# Patient Record
Sex: MALE | Race: WHITE | NOT HISPANIC OR LATINO
[De-identification: names, ages, dates, MRNs, and addresses within clinical notes are randomized per-mention and may not be internally consistent; named-entity substitution may affect disease eponyms.]

---

## 2019-03-12 ENCOUNTER — APPOINTMENT (OUTPATIENT)
Dept: VASCULAR SURGERY | Facility: CLINIC | Age: 84
End: 2019-03-12
Payer: COMMERCIAL

## 2019-03-12 VITALS
BODY MASS INDEX: 28.63 KG/M2 | HEIGHT: 70 IN | WEIGHT: 200 LBS | TEMPERATURE: 97.8 F | OXYGEN SATURATION: 99 % | SYSTOLIC BLOOD PRESSURE: 150 MMHG | DIASTOLIC BLOOD PRESSURE: 64 MMHG | HEART RATE: 50 BPM

## 2019-03-12 DIAGNOSIS — M79.672 PAIN IN LEFT FOOT: ICD-10-CM

## 2019-03-12 DIAGNOSIS — Z82.49 FAMILY HISTORY OF ISCHEMIC HEART DISEASE AND OTHER DISEASES OF THE CIRCULATORY SYSTEM: ICD-10-CM

## 2019-03-12 DIAGNOSIS — Z85.46 PERSONAL HISTORY OF MALIGNANT NEOPLASM OF PROSTATE: ICD-10-CM

## 2019-03-12 DIAGNOSIS — Z86.79 PERSONAL HISTORY OF OTHER DISEASES OF THE CIRCULATORY SYSTEM: ICD-10-CM

## 2019-03-12 DIAGNOSIS — Z85.828 PERSONAL HISTORY OF OTHER MALIGNANT NEOPLASM OF SKIN: ICD-10-CM

## 2019-03-12 PROBLEM — Z00.00 ENCOUNTER FOR PREVENTIVE HEALTH EXAMINATION: Status: ACTIVE | Noted: 2019-03-12

## 2019-03-12 PROCEDURE — 99203 OFFICE O/P NEW LOW 30 MIN: CPT

## 2019-03-12 RX ORDER — ATORVASTATIN CALCIUM 10 MG/1
10 TABLET, FILM COATED ORAL
Refills: 0 | Status: ACTIVE | COMMUNITY

## 2019-03-12 RX ORDER — ATENOLOL 50 MG/1
TABLET ORAL
Refills: 0 | Status: ACTIVE | COMMUNITY

## 2019-03-12 NOTE — HISTORY OF PRESENT ILLNESS
[FreeTextEntry1] : 84 yo M with history of HTN, CAD s/p PCI, prostate cancer, skin cancer, presenting with left plantar foot pain while ambulating. Pt was evaluated by a podiatrist and was told to wear a padded insert to cushion the protruding bone near the ball of his foot. He denies claudication or rest pain. He denies gangrene or ulcers. Denies chest pain, SOB, JONES. No history of prior peripheral interventions. He has coronary stents.\par He underwent noninvasive arterial testing, which was negative for PAD.

## 2019-03-12 NOTE — PHYSICAL EXAM
[Normal Breath Sounds] : Normal breath sounds [Normal Heart Sounds] : normal heart sounds [Normal Rate and Rhythm] : normal rate and rhythm [2+] : left 2+ [Ankle Swelling (On Exam)] : present [Ankle Swelling On The Left] : of the left ankle [Ankle Swelling On The Right] : mild [Varicose Veins Of Lower Extremities] : not present [] : not present [No Rash or Lesion] : No rash or lesion [Alert] : alert [Oriented to Person] : oriented to person [Oriented to Place] : oriented to place [Oriented to Time] : oriented to time [Calm] : calm [de-identified] : NAD [de-identified] : EOMI [de-identified] : supple [FreeTextEntry1] : <2 sec cap refill\par Tenderness over a bony prominence on plantar aspect of left foot [de-identified] : soft, NT, ND, no pulsatile mass [de-identified] : FROM of all 4 extremities

## 2019-03-12 NOTE — ASSESSMENT
[FreeTextEntry1] : 82 yo M with history of HTN, CAD s/p PCI, prostate cancer, skin cancer, presenting with left plantar foot pain while ambulating. No evidence of PAD.\par \par JAIME/PVR study reviewed-- normal study.

## 2022-03-29 ENCOUNTER — NON-APPOINTMENT (OUTPATIENT)
Age: 87
End: 2022-03-29

## 2022-03-29 ENCOUNTER — APPOINTMENT (OUTPATIENT)
Dept: OPHTHALMOLOGY | Facility: CLINIC | Age: 87
End: 2022-03-29
Payer: MEDICARE

## 2022-03-29 PROCEDURE — 92014 COMPRE OPH EXAM EST PT 1/>: CPT

## 2022-05-03 ENCOUNTER — APPOINTMENT (OUTPATIENT)
Dept: OPHTHALMOLOGY | Facility: CLINIC | Age: 87
End: 2022-05-03
Payer: MEDICARE

## 2022-05-03 ENCOUNTER — NON-APPOINTMENT (OUTPATIENT)
Age: 87
End: 2022-05-03

## 2022-05-03 PROCEDURE — 99213 OFFICE O/P EST LOW 20 MIN: CPT

## 2022-05-03 PROCEDURE — 92136 OPHTHALMIC BIOMETRY: CPT

## 2022-05-17 ENCOUNTER — NON-APPOINTMENT (OUTPATIENT)
Age: 87
End: 2022-05-17

## 2022-05-18 ENCOUNTER — APPOINTMENT (OUTPATIENT)
Dept: OPHTHALMOLOGY | Facility: HOSPITAL | Age: 87
End: 2022-05-18
Payer: MEDICARE

## 2022-05-18 PROCEDURE — 66984 XCAPSL CTRC RMVL W/O ECP: CPT | Mod: LT

## 2022-05-19 ENCOUNTER — NON-APPOINTMENT (OUTPATIENT)
Age: 87
End: 2022-05-19

## 2022-05-19 ENCOUNTER — APPOINTMENT (OUTPATIENT)
Dept: OPHTHALMOLOGY | Facility: CLINIC | Age: 87
End: 2022-05-19
Payer: MEDICARE

## 2022-05-19 PROCEDURE — 99024 POSTOP FOLLOW-UP VISIT: CPT

## 2022-05-24 ENCOUNTER — APPOINTMENT (OUTPATIENT)
Dept: OPHTHALMOLOGY | Facility: CLINIC | Age: 87
End: 2022-05-24
Payer: MEDICARE

## 2022-05-24 ENCOUNTER — NON-APPOINTMENT (OUTPATIENT)
Age: 87
End: 2022-05-24

## 2022-05-24 PROCEDURE — 99024 POSTOP FOLLOW-UP VISIT: CPT

## 2022-06-14 ENCOUNTER — NON-APPOINTMENT (OUTPATIENT)
Age: 87
End: 2022-06-14

## 2022-06-14 ENCOUNTER — APPOINTMENT (OUTPATIENT)
Dept: OPHTHALMOLOGY | Facility: CLINIC | Age: 87
End: 2022-06-14
Payer: MEDICARE

## 2022-06-14 PROCEDURE — 99024 POSTOP FOLLOW-UP VISIT: CPT

## 2022-09-20 ENCOUNTER — APPOINTMENT (OUTPATIENT)
Dept: OPHTHALMOLOGY | Facility: CLINIC | Age: 87
End: 2022-09-20

## 2024-01-18 ENCOUNTER — EMERGENCY (EMERGENCY)
Facility: HOSPITAL | Age: 89
LOS: 1 days | Discharge: HOME | End: 2024-01-18
Attending: EMERGENCY MEDICINE
Payer: MEDICARE

## 2024-01-18 VITALS
OXYGEN SATURATION: 95 % | WEIGHT: 199.96 LBS | HEART RATE: 75 BPM | RESPIRATION RATE: 18 BRPM | HEIGHT: 70 IN | SYSTOLIC BLOOD PRESSURE: 129 MMHG | TEMPERATURE: 98 F | DIASTOLIC BLOOD PRESSURE: 86 MMHG

## 2024-01-18 VITALS
DIASTOLIC BLOOD PRESSURE: 85 MMHG | RESPIRATION RATE: 18 BRPM | SYSTOLIC BLOOD PRESSURE: 159 MMHG | TEMPERATURE: 98 F | OXYGEN SATURATION: 98 % | HEART RATE: 78 BPM

## 2024-01-18 PROCEDURE — 99284 EMERGENCY DEPT VISIT MOD MDM: CPT

## 2024-01-18 PROCEDURE — G1004: CPT

## 2024-01-18 PROCEDURE — 90471 IMMUNIZATION ADMIN: CPT

## 2024-01-18 PROCEDURE — 90715 TDAP VACCINE 7 YRS/> IM: CPT

## 2024-01-18 PROCEDURE — 99284 EMERGENCY DEPT VISIT MOD MDM: CPT | Mod: 25

## 2024-01-18 PROCEDURE — 70450 CT HEAD/BRAIN W/O DYE: CPT | Mod: MG

## 2024-01-18 PROCEDURE — 70450 CT HEAD/BRAIN W/O DYE: CPT | Mod: 26,MG

## 2024-01-18 RX ORDER — TETANUS TOXOID, REDUCED DIPHTHERIA TOXOID AND ACELLULAR PERTUSSIS VACCINE, ADSORBED 5; 2.5; 8; 8; 2.5 [IU]/.5ML; [IU]/.5ML; UG/.5ML; UG/.5ML; UG/.5ML
0.5 SUSPENSION INTRAMUSCULAR ONCE
Refills: 0 | Status: COMPLETED | OUTPATIENT
Start: 2024-01-18 | End: 2024-01-18

## 2024-01-18 RX ADMIN — TETANUS TOXOID, REDUCED DIPHTHERIA TOXOID AND ACELLULAR PERTUSSIS VACCINE, ADSORBED 0.5 MILLILITER(S): 5; 2.5; 8; 8; 2.5 SUSPENSION INTRAMUSCULAR at 12:04

## 2024-01-18 NOTE — ED ADULT NURSE NOTE - CAS EDN DISCHARGE ASSESSMENT
Spoke to patient. Patient is worried after reviewing his test results particularly urinalysis ketones showed trace and occult blood was also trace. Please advise.    Alert and oriented to person, place and time

## 2024-01-18 NOTE — ED PROVIDER NOTE - CARE PLAN
1 Principal Discharge DX:	Laceration of eyebrow and forehead  Secondary Diagnosis:	Closed head injury

## 2024-01-18 NOTE — ED ADULT NURSE NOTE - NS ED NURSE LEVEL OF CONSCIOUSNESS SPEECH
SCHEDULED MEDIATION ROCEPHIN GIVEN AND TOLERATED WELL. NO S/S OF RESPIRATORY DISTRESS OR 
DISCOMFORT NOTED AT THIS TIME. WILL CONTINUE TO MONITOR. Speaking Coherently

## 2024-01-18 NOTE — ED ADULT NURSE NOTE - NSFALLHARMRISKINTERV_ED_ALL_ED

## 2024-01-18 NOTE — ED ADULT TRIAGE NOTE - CHIEF COMPLAINT QUOTE
Pt BIBA from [facility], unwitnessed fall, hit head, denies blood thinners, unsure of LOC, hx of dementia

## 2024-01-18 NOTE — ED ADULT NURSE NOTE - OBJECTIVE STATEMENT
c/o mechanical trip and fall from standing height, + head strike, - LOC, - blood thinners. Pt denies HA, dizziness, SOB, N/V/D, CP, palpitations, fevers, chills, pain. PMH of vascular history, HTN, a fib. Pt Aox4, speaking coherently, respirations even and unlabored on RA skin warm and dry, bruising noted to left forehead.

## 2024-01-18 NOTE — ED PROVIDER NOTE - PATIENT PORTAL LINK FT
You can access the FollowMyHealth Patient Portal offered by [facility] by registering at the following website: [email or web address]. By joining [facility]’s FollowMyHealth portal, you will also be able to view your health information using other applications (apps) compatible with our system.

## 2024-01-18 NOTE — ED ADULT NURSE REASSESSMENT NOTE - NS ED NURSE REASSESS COMMENT FT1
Pt resting comfortably, denies HA, dizziness, SOB, N/V/D, CP, palpitations, fevers, chills, pain. Pt AOx4, speaking coherently, respirations even and unlabored on RA, skin warm and dry. Transportation arranged for DC

## 2024-01-18 NOTE — CHART NOTE - NSCHARTNOTEFT_GEN_A_CORE
SWNote: p/c from pt's EDMD (Dr Flores) to inform worker pt ready for d/c, spoke with staff at [facility] was told pt can return as long as Cscan is negative. Pt needs transportation to return. Worker called [facility]/Vanessa spoke with Donya ,d/c to be faxed at [phone] and natalie to be arranged asap. [facility] called(Charity )  natalie arranged, ETA first available . [facility]/billing letter will be left at nurse station.

## 2024-01-18 NOTE — ED ADULT NURSE REASSESSMENT NOTE - NS ED NURSE REASSESS COMMENT FT1
Pt resting comfortably, denies HA, dizziness, SOB, N/V/D, CP, palpitations, fevers, chills. Pt Aox4, speaking coherently, respirations even and unlabored on RA, skin warm and dry. VS recorded. No IV present. Ambulance present for DC back to [facility].

## 2024-01-18 NOTE — ED ADULT NURSE NOTE - CHIEF COMPLAINT QUOTE
Pt BIBA from [facility], unwitnessed fall, hit head, denies blood thinners, unsure of LOC, hx of dementia

## 2024-01-18 NOTE — ED PROVIDER NOTE - PHYSICAL EXAMINATION
Gen: NAD, awake and alert  Head: 2cm superficial laceration present to superior aspect of left eyebrow not actively bleeding  HEENT: oral mucosa moist, normal conjunctiva, oropharynx clear without exudate or erythema  Lung: CTAB, no respiratory distress, no wheezing, rales, rhonchi  CV: normal s1/s2, rrr, no murmurs, Normal perfusion  Abd: soft, NTND  MSK: No edema, no visible deformities, full range of motion in all 4 extremities  Neuro: No focal neurologic deficits, no midline c/t/l spine tenderness  Skin: laceration as noted above  Psych: normal affect

## 2024-01-28 ENCOUNTER — EMERGENCY (EMERGENCY)
Facility: HOSPITAL | Age: 89
LOS: 1 days | Discharge: HOME | End: 2024-01-28
Attending: EMERGENCY MEDICINE
Payer: MEDICARE

## 2024-01-28 VITALS
DIASTOLIC BLOOD PRESSURE: 86 MMHG | SYSTOLIC BLOOD PRESSURE: 136 MMHG | TEMPERATURE: 98 F | RESPIRATION RATE: 16 BRPM | OXYGEN SATURATION: 100 % | HEART RATE: 70 BPM | HEIGHT: 70 IN

## 2024-01-28 PROCEDURE — 72125 CT NECK SPINE W/O DYE: CPT | Mod: 26,MA

## 2024-01-28 PROCEDURE — 70450 CT HEAD/BRAIN W/O DYE: CPT | Mod: 26,MA

## 2024-01-28 PROCEDURE — 99284 EMERGENCY DEPT VISIT MOD MDM: CPT | Mod: GC

## 2024-01-28 NOTE — ED ADULT TRIAGE NOTE - CHIEF COMPLAINT QUOTE
Pt. BIBA from [facility] for fall. Pt. has hx of dementia, fall witnessed by other residents with dementia. Pt. doesn't remember falling, pt. denies complaints. Pt. is on eliquis. Pt. A&OX4 at this time. MD Kowalski at bedside for eval. Priority ct called.

## 2024-01-28 NOTE — ED PROVIDER NOTE - ATTENDING CONTRIBUTION TO CARE
patient with past medical history of A-fib on Eliquis and dementia brought in by EMS after a fall at the [facility].  CT head and C-spine ordered.  Patient at baseline mental status.  CTs unremarkable. Spoke with [facility] who accepts patient back.

## 2024-01-28 NOTE — ED PROVIDER NOTE - CLINICAL SUMMARY MEDICAL DECISION MAKING FREE TEXT BOX
patient with past medical history of A-fib on Eliquis and dementia brought in by EMS after a fall at the [facility].  CT head and C-spine ordered.  Patient at baseline mental status.  CTs unremarkable. Spoke with [facility] who accepts patient back. patient with past medical history of A-fib on Eliquis and dementia brought in by EMS after a fall at the [facility].  CT head and C-spine ordered.  Patient at baseline mental status.  CTs unremarkable. Spoke with [facility] who accepts patient back.

## 2024-01-28 NOTE — ED PROVIDER NOTE - NSFOLLOWUPINSTRUCTIONS_ED_ALL_ED_FT
-Please follow-up with your primary care doctor in the next 2 days.  Please call tomorrow for an appointment.  If you cannot follow-up with your primary care doctor please return to the ED for any urgent issues.  - You were given a copy of the tests performed today.  Please bring the results with you and review them with your primary care doctor.  - If you have any worsening of symptoms or any other concerns please return to the ED immediately.  - Please continue taking your home medications as directed.    Fall    It is common to have injuries to your face, neck, arms, and body after a fall. These injuries may include cuts, burns, bruises, and sore muscles. These injuries tend to feel worse for the first 24–48 hours but will start to feel better after that. Over the counter pain medications are effective in controlling pain.    SEEK IMMEDIATE MEDICAL CARE IF YOU HAVE ANY OF THE FOLLOWING SYMPTOMS: numbness, tingling, or weakness in your arms or legs, severe neck pain, changes in bowel or bladder control, shortness of breath, chest pain, blood in your urine/stool/vomit, headache, visual changes, lightheadedness/dizziness, or fainting.

## 2024-01-28 NOTE — ED PROVIDER NOTE - NS ED ATTENDING STATEMENT MOD
I have seen and examined this patient and fully participated in the care of this patient as the teaching attending.  The service was shared with the JACQUI.  I reviewed and verified the documentation. Attending with

## 2024-01-28 NOTE — ED PROVIDER NOTE - OBJECTIVE STATEMENT
88-year-old male patient brought in by EMS after a fall at the [facility].  Patient reportedly takes Eliquis for A-fib.  He is currently without complaint but has poor recollection of the events which he reports is his baseline secondary to dementia.   Patient brought directly to CAT scan for imaging. 88-year-old male patient brought in by EMS after a fall at the [facility].  Patient reportedly takes Eliquis for A-fib.  He is currently without complaint but has poor recollection of the events which he reports is his baseline secondary to dementia.   Patient brought directly to CAT scan for imaging.

## 2024-01-28 NOTE — ED PROVIDER NOTE - PATIENT PORTAL LINK FT
You can access the FollowMyHealth Patient Portal offered by [facility] by registering at the following website: [email or web address]. By joining [facility]’s FollowMyHealth portal, you will also be able to view your health information using other applications (apps) compatible with our system.

## 2024-01-28 NOTE — CHART NOTE - NSCHARTNOTEFT_GEN_A_CORE
SW Note: HALLE reviewed chart - pt was already cleared to return and MD had reached out to [facility] who can accept pt back. HALLE reached out to the [facility] [[phone]] and was able to speak to Brandi in admissions. Brandi confirmed pt was accepted back and reports they do not need any paperwork unless there are any changes in medication, status, or any new recs for pt. HALLE reached out to MD Resident - Resident confirmed there are no new changes for pt and that pt is at baseline. Resident confirmed that [facility] had already set up transportation for pt to return back. SW remains available as needed.

## 2024-01-29 ENCOUNTER — INPATIENT (INPATIENT)
Facility: HOSPITAL | Age: 89
LOS: 6 days | Discharge: HOME | DRG: 82 | End: 2024-02-05
Attending: STUDENT IN AN ORGANIZED HEALTH CARE EDUCATION/TRAINING PROGRAM | Admitting: STUDENT IN AN ORGANIZED HEALTH CARE EDUCATION/TRAINING PROGRAM
Payer: MEDICARE

## 2024-01-29 VITALS
HEART RATE: 100 BPM | HEIGHT: 70 IN | DIASTOLIC BLOOD PRESSURE: 64 MMHG | RESPIRATION RATE: 12 BRPM | OXYGEN SATURATION: 93 % | SYSTOLIC BLOOD PRESSURE: 147 MMHG | WEIGHT: 184.97 LBS | TEMPERATURE: 98 F

## 2024-01-29 DIAGNOSIS — S06.5XAA TRAUMATIC SUBDURAL HEMORRHAGE WITH LOSS OF CONSCIOUSNESS STATUS UNKNOWN, INITIAL ENCOUNTER: ICD-10-CM

## 2024-01-29 LAB
ALBUMIN SERPL ELPH-MCNC: 3.3 G/DL — SIGNIFICANT CHANGE UP (ref 3.3–5.2)
ALP SERPL-CCNC: 88 U/L — SIGNIFICANT CHANGE UP (ref 40–120)
ALT FLD-CCNC: 12 U/L — SIGNIFICANT CHANGE UP
ANION GAP SERPL CALC-SCNC: 9 MMOL/L — SIGNIFICANT CHANGE UP (ref 5–17)
APPEARANCE UR: CLEAR — SIGNIFICANT CHANGE UP
APTT BLD: 35.9 SEC — HIGH (ref 24.5–35.6)
AST SERPL-CCNC: 14 U/L — SIGNIFICANT CHANGE UP
BASE EXCESS BLDV CALC-SCNC: -0.6 MMOL/L — SIGNIFICANT CHANGE UP (ref -2–3)
BASOPHILS # BLD AUTO: 0.03 K/UL — SIGNIFICANT CHANGE UP (ref 0–0.2)
BASOPHILS NFR BLD AUTO: 0.5 % — SIGNIFICANT CHANGE UP (ref 0–2)
BILIRUB SERPL-MCNC: 0.5 MG/DL — SIGNIFICANT CHANGE UP (ref 0.4–2)
BILIRUB UR-MCNC: NEGATIVE — SIGNIFICANT CHANGE UP
BUN SERPL-MCNC: 19.1 MG/DL — SIGNIFICANT CHANGE UP (ref 8–20)
CA-I SERPL-SCNC: 1.18 MMOL/L — SIGNIFICANT CHANGE UP (ref 1.15–1.33)
CALCIUM SERPL-MCNC: 8.2 MG/DL — LOW (ref 8.4–10.5)
CHLORIDE BLDV-SCNC: 98 MMOL/L — SIGNIFICANT CHANGE UP (ref 96–108)
CHLORIDE SERPL-SCNC: 100 MMOL/L — SIGNIFICANT CHANGE UP (ref 96–108)
CO2 SERPL-SCNC: 26 MMOL/L — SIGNIFICANT CHANGE UP (ref 22–29)
COLOR SPEC: YELLOW — SIGNIFICANT CHANGE UP
CREAT SERPL-MCNC: 0.94 MG/DL — SIGNIFICANT CHANGE UP (ref 0.5–1.3)
DIFF PNL FLD: NEGATIVE — SIGNIFICANT CHANGE UP
EGFR: 78 ML/MIN/1.73M2 — SIGNIFICANT CHANGE UP
EOSINOPHIL # BLD AUTO: 0.12 K/UL — SIGNIFICANT CHANGE UP (ref 0–0.5)
EOSINOPHIL NFR BLD AUTO: 2 % — SIGNIFICANT CHANGE UP (ref 0–6)
ETHANOL SERPL-MCNC: <10 MG/DL — SIGNIFICANT CHANGE UP (ref 0–9)
GAS PNL BLDV: 132 MMOL/L — LOW (ref 136–145)
GAS PNL BLDV: SIGNIFICANT CHANGE UP
GLUCOSE BLDV-MCNC: 114 MG/DL — HIGH (ref 70–99)
GLUCOSE SERPL-MCNC: 127 MG/DL — HIGH (ref 70–99)
GLUCOSE UR QL: NEGATIVE MG/DL — SIGNIFICANT CHANGE UP
HCO3 BLDV-SCNC: 26 MMOL/L — SIGNIFICANT CHANGE UP (ref 22–29)
HCT VFR BLD CALC: 31.4 % — LOW (ref 39–50)
HCT VFR BLDA CALC: 30 % — SIGNIFICANT CHANGE UP
HGB BLD CALC-MCNC: 10.1 G/DL — LOW (ref 12.6–17.4)
HGB BLD-MCNC: 10.1 G/DL — LOW (ref 13–17)
IMM GRANULOCYTES NFR BLD AUTO: 0.5 % — SIGNIFICANT CHANGE UP (ref 0–0.9)
INR BLD: 1.68 RATIO — HIGH (ref 0.85–1.18)
KETONES UR-MCNC: NEGATIVE MG/DL — SIGNIFICANT CHANGE UP
LACTATE BLDV-MCNC: 1.2 MMOL/L — SIGNIFICANT CHANGE UP (ref 0.5–2)
LACTATE SERPL-SCNC: 1 MMOL/L — SIGNIFICANT CHANGE UP (ref 0.5–2)
LEUKOCYTE ESTERASE UR-ACNC: NEGATIVE — SIGNIFICANT CHANGE UP
LIDOCAIN IGE QN: 23 U/L — SIGNIFICANT CHANGE UP (ref 22–51)
LYMPHOCYTES # BLD AUTO: 0.96 K/UL — LOW (ref 1–3.3)
LYMPHOCYTES # BLD AUTO: 16.2 % — SIGNIFICANT CHANGE UP (ref 13–44)
MCHC RBC-ENTMCNC: 29 PG — SIGNIFICANT CHANGE UP (ref 27–34)
MCHC RBC-ENTMCNC: 32.2 GM/DL — SIGNIFICANT CHANGE UP (ref 32–36)
MCV RBC AUTO: 90.2 FL — SIGNIFICANT CHANGE UP (ref 80–100)
MONOCYTES # BLD AUTO: 0.65 K/UL — SIGNIFICANT CHANGE UP (ref 0–0.9)
MONOCYTES NFR BLD AUTO: 11 % — SIGNIFICANT CHANGE UP (ref 2–14)
NEUTROPHILS # BLD AUTO: 4.14 K/UL — SIGNIFICANT CHANGE UP (ref 1.8–7.4)
NEUTROPHILS NFR BLD AUTO: 69.8 % — SIGNIFICANT CHANGE UP (ref 43–77)
NITRITE UR-MCNC: NEGATIVE — SIGNIFICANT CHANGE UP
PCO2 BLDV: 51 MMHG — SIGNIFICANT CHANGE UP (ref 42–55)
PH BLDV: 7.31 — LOW (ref 7.32–7.43)
PH UR: 5.5 — SIGNIFICANT CHANGE UP (ref 5–8)
PLATELET # BLD AUTO: 175 K/UL — SIGNIFICANT CHANGE UP (ref 150–400)
PO2 BLDV: 45 MMHG — SIGNIFICANT CHANGE UP (ref 25–45)
POTASSIUM BLDV-SCNC: 4.1 MMOL/L — SIGNIFICANT CHANGE UP (ref 3.5–5.1)
POTASSIUM SERPL-MCNC: 4.2 MMOL/L — SIGNIFICANT CHANGE UP (ref 3.5–5.3)
POTASSIUM SERPL-SCNC: 4.2 MMOL/L — SIGNIFICANT CHANGE UP (ref 3.5–5.3)
PROT SERPL-MCNC: 5.4 G/DL — LOW (ref 6.6–8.7)
PROT UR-MCNC: NEGATIVE MG/DL — SIGNIFICANT CHANGE UP
PROTHROM AB SERPL-ACNC: 18.4 SEC — HIGH (ref 9.5–13)
RBC # BLD: 3.48 M/UL — LOW (ref 4.2–5.8)
RBC # FLD: 16.3 % — HIGH (ref 10.3–14.5)
SAO2 % BLDV: 66.6 % — SIGNIFICANT CHANGE UP
SODIUM SERPL-SCNC: 135 MMOL/L — SIGNIFICANT CHANGE UP (ref 135–145)
SP GR SPEC: >1.03 — HIGH (ref 1–1.03)
UROBILINOGEN FLD QL: 0.2 MG/DL — SIGNIFICANT CHANGE UP (ref 0.2–1)
WBC # BLD: 5.93 K/UL — SIGNIFICANT CHANGE UP (ref 3.8–10.5)
WBC # FLD AUTO: 5.93 K/UL — SIGNIFICANT CHANGE UP (ref 3.8–10.5)

## 2024-01-29 PROCEDURE — 70450 CT HEAD/BRAIN W/O DYE: CPT | Mod: 26

## 2024-01-29 PROCEDURE — 99291 CRITICAL CARE FIRST HOUR: CPT | Mod: 25,GC

## 2024-01-29 PROCEDURE — 71045 X-RAY EXAM CHEST 1 VIEW: CPT | Mod: 26

## 2024-01-29 PROCEDURE — 93306 TTE W/DOPPLER COMPLETE: CPT | Mod: 26

## 2024-01-29 PROCEDURE — 99223 1ST HOSP IP/OBS HIGH 75: CPT

## 2024-01-29 PROCEDURE — 93010 ELECTROCARDIOGRAM REPORT: CPT

## 2024-01-29 PROCEDURE — 74177 CT ABD & PELVIS W/CONTRAST: CPT | Mod: 26,MA

## 2024-01-29 PROCEDURE — 72170 X-RAY EXAM OF PELVIS: CPT | Mod: 26

## 2024-01-29 PROCEDURE — 99222 1ST HOSP IP/OBS MODERATE 55: CPT | Mod: GC

## 2024-01-29 PROCEDURE — 72125 CT NECK SPINE W/O DYE: CPT | Mod: 26,MA

## 2024-01-29 PROCEDURE — 70450 CT HEAD/BRAIN W/O DYE: CPT | Mod: 26,MA,77

## 2024-01-29 PROCEDURE — 12001 RPR S/N/AX/GEN/TRNK 2.5CM/<: CPT | Mod: GC

## 2024-01-29 PROCEDURE — 93970 EXTREMITY STUDY: CPT | Mod: 26

## 2024-01-29 PROCEDURE — 99222 1ST HOSP IP/OBS MODERATE 55: CPT

## 2024-01-29 PROCEDURE — 71260 CT THORAX DX C+: CPT | Mod: 26,MA

## 2024-01-29 RX ORDER — CEFAZOLIN SODIUM 1 G
2000 VIAL (EA) INJECTION EVERY 8 HOURS
Refills: 0 | Status: DISCONTINUED | OUTPATIENT
Start: 2024-01-29 | End: 2024-01-29

## 2024-01-29 RX ORDER — DONEPEZIL HYDROCHLORIDE 10 MG/1
1 TABLET, FILM COATED ORAL
Refills: 0 | DISCHARGE

## 2024-01-29 RX ORDER — HALOPERIDOL DECANOATE 100 MG/ML
5 INJECTION INTRAMUSCULAR ONCE
Refills: 0 | Status: DISCONTINUED | OUTPATIENT
Start: 2024-01-29 | End: 2024-01-29

## 2024-01-29 RX ORDER — LIDOCAINE HCL 20 MG/ML
5 VIAL (ML) INJECTION ONCE
Refills: 0 | Status: COMPLETED | OUTPATIENT
Start: 2024-01-29 | End: 2024-01-29

## 2024-01-29 RX ORDER — METOPROLOL TARTRATE 50 MG
25 TABLET ORAL
Refills: 0 | Status: DISCONTINUED | OUTPATIENT
Start: 2024-01-29 | End: 2024-02-02

## 2024-01-29 RX ORDER — ACETAMINOPHEN 500 MG
2 TABLET ORAL
Refills: 0 | DISCHARGE

## 2024-01-29 RX ORDER — LANOLIN ALCOHOL/MO/W.PET/CERES
3 CREAM (GRAM) TOPICAL AT BEDTIME
Refills: 0 | Status: DISCONTINUED | OUTPATIENT
Start: 2024-01-29 | End: 2024-02-05

## 2024-01-29 RX ORDER — DONEPEZIL HYDROCHLORIDE 10 MG/1
5 TABLET, FILM COATED ORAL AT BEDTIME
Refills: 0 | Status: DISCONTINUED | OUTPATIENT
Start: 2024-01-29 | End: 2024-02-05

## 2024-01-29 RX ORDER — FUROSEMIDE 40 MG
1 TABLET ORAL
Refills: 0 | DISCHARGE

## 2024-01-29 RX ORDER — LANOLIN ALCOHOL/MO/W.PET/CERES
1 CREAM (GRAM) TOPICAL
Refills: 0 | DISCHARGE

## 2024-01-29 RX ORDER — MAGNESIUM OXIDE 400 MG ORAL TABLET 241.3 MG
1 TABLET ORAL
Refills: 0 | DISCHARGE

## 2024-01-29 RX ORDER — METOPROLOL TARTRATE 50 MG
2.5 TABLET ORAL EVERY 6 HOURS
Refills: 0 | Status: DISCONTINUED | OUTPATIENT
Start: 2024-01-29 | End: 2024-01-29

## 2024-01-29 RX ORDER — ATORVASTATIN CALCIUM 80 MG/1
1 TABLET, FILM COATED ORAL
Refills: 0 | DISCHARGE

## 2024-01-29 RX ORDER — MEMANTINE HYDROCHLORIDE 10 MG/1
1 TABLET ORAL
Refills: 0 | DISCHARGE

## 2024-01-29 RX ORDER — FUROSEMIDE 40 MG
20 TABLET ORAL DAILY
Refills: 0 | Status: DISCONTINUED | OUTPATIENT
Start: 2024-01-29 | End: 2024-01-30

## 2024-01-29 RX ORDER — ATORVASTATIN CALCIUM 80 MG/1
40 TABLET, FILM COATED ORAL AT BEDTIME
Refills: 0 | Status: DISCONTINUED | OUTPATIENT
Start: 2024-01-29 | End: 2024-02-05

## 2024-01-29 RX ORDER — LEVETIRACETAM 250 MG/1
250 TABLET, FILM COATED ORAL
Refills: 0 | Status: DISCONTINUED | OUTPATIENT
Start: 2024-01-29 | End: 2024-02-05

## 2024-01-29 RX ORDER — POTASSIUM CHLORIDE 20 MEQ
10 PACKET (EA) ORAL DAILY
Refills: 0 | Status: DISCONTINUED | OUTPATIENT
Start: 2024-01-29 | End: 2024-01-30

## 2024-01-29 RX ORDER — SERTRALINE 25 MG/1
1 TABLET, FILM COATED ORAL
Refills: 0 | DISCHARGE

## 2024-01-29 RX ORDER — TETANUS AND DIPHTHERIA TOXOIDS ADSORBED 2; 2 [LF]/.5ML; [LF]/.5ML
0.5 INJECTION INTRAMUSCULAR ONCE
Refills: 0 | Status: DISCONTINUED | OUTPATIENT
Start: 2024-01-29 | End: 2024-02-05

## 2024-01-29 RX ORDER — POTASSIUM CHLORIDE 20 MEQ
1 PACKET (EA) ORAL
Refills: 0 | DISCHARGE

## 2024-01-29 RX ORDER — SERTRALINE 25 MG/1
50 TABLET, FILM COATED ORAL DAILY
Refills: 0 | Status: DISCONTINUED | OUTPATIENT
Start: 2024-01-29 | End: 2024-02-05

## 2024-01-29 RX ORDER — CEFAZOLIN SODIUM 1 G
2000 VIAL (EA) INJECTION EVERY 8 HOURS
Refills: 0 | Status: COMPLETED | OUTPATIENT
Start: 2024-01-29 | End: 2024-02-03

## 2024-01-29 RX ORDER — ASPIRIN/CALCIUM CARB/MAGNESIUM 324 MG
1 TABLET ORAL
Refills: 0 | DISCHARGE

## 2024-01-29 RX ORDER — MEMANTINE HYDROCHLORIDE 10 MG/1
5 TABLET ORAL DAILY
Refills: 0 | Status: DISCONTINUED | OUTPATIENT
Start: 2024-01-29 | End: 2024-02-05

## 2024-01-29 RX ADMIN — ATORVASTATIN CALCIUM 40 MILLIGRAM(S): 80 TABLET, FILM COATED ORAL at 22:07

## 2024-01-29 RX ADMIN — Medication 2000 MILLIGRAM(S): at 22:07

## 2024-01-29 RX ADMIN — Medication 2.5 MILLIGRAM(S): at 18:46

## 2024-01-29 RX ADMIN — LEVETIRACETAM 250 MILLIGRAM(S): 250 TABLET, FILM COATED ORAL at 18:47

## 2024-01-29 RX ADMIN — Medication 5 MILLILITER(S): at 15:12

## 2024-01-29 NOTE — ED ADULT NURSE REASSESSMENT NOTE - NURSING NEURO LEVEL OF CONSCIOUSNESS
lethargic
follows commands
follows commands/lethargic
alert and awake
follows commands

## 2024-01-29 NOTE — ED ADULT NURSE NOTE - OBJECTIVE STATEMENT
Pt A&Ox2, not alert to time/place/situation arrives from nursing home s/p fall with headstrike. Confirmed hemorrhage. Airway patent. Respirations even and unlabored. Pt A&Ox2, not alert to time/place/situation arrives from nursing home s/p fall with headstrike. Confirmed hemorrhage. LAC to the posterior surface of the head. Bleeding controlled. Ecchymosis noted to the left chest. Cellulitis to the right lower extremity. Pt denies SOB, CP, headache, dizziness or nausea. Airway patent. Respirations even and unlabored.

## 2024-01-29 NOTE — CONSULT NOTE ADULT - ASSESSMENT
89 y/o male with Hx of HTN, HLD, prostate cancer, dementia and afib on eliquis presenting after a mechanical fall this morning. Per EMS the patient was walking in the hallway when he fell backwards and struck his head against the wall/the floor. He was noted to possibly have some confusion as compared to his baseline per his nursing facility, He has a small occipital head lac that is not actively bleeding, imaging in ED done showed Acute subdural hematoma at the right vertex, measuring 4-5 mm in maximal thickness. No underlying mass effect or vasogenic edema. No acute calvarial fracture visualized, admitted under Trauma Service and was seen by Neurosurgery team, medicine consult for Claudette Trauma eval       -Admit to SICU  -q1 neurochecks per SICU  -Repeat CT in 6 hours, if worse then reversal is recommended by NSGY  -Age indeterminant T5 VB fracture, no ttp or pain reported by patient   -DVT ppx with SCDs  -Holding asa and coumadin  -MM pain control  -Head lac repaired with staples, continue local wound care  -Remainder of care per SICU  -Discussed with surgical attending and SICU team    87 y/o male with Hx of HTN, HLD, prostate cancer, dementia and afib on eliquis presenting after a mechanical fall this morning. Per EMS the patient was walking in the hallway when he fell backwards and struck his head against the wall/the floor. He was noted to possibly have some confusion as compared to his baseline per his nursing facility, He has a small occipital head lac that is not actively bleeding, imaging in ED done showed Acute subdural hematoma at the right vertex, measuring 4-5 mm in maximal thickness. No underlying mass effect or vasogenic edema. No acute calvarial fracture visualized, admitted under Trauma Service and was seen by Neurosurgery team, medicine consult for Claudette Trauma eval     Plan:     Identification of Seniors at Risk:                                                                                                                                       Before the event that brought you to the hospital, did you need someone to help you on a regular basis?    unable to answer   In the 24 hours before your injury, have you needed more help than usual?                                                 Unable to anser          Have you been hospitalized for one or more nights during the past six months?                                         Unable to answer   In general, do you have serious problems with your vision that cannot be corrected with glasses?                Unable to anser   In general, do you have serious problems with your memory?                                                                    yes he has dementia and he is pleasantly confused               Do you take six or more different medications every day?                                                                          yes                    A positive screen is an answer of yes to 2 or more - Total:     his screening is positive    would recommend   -r/o syncope   -orthostatic vitals   -cardiac monitoring   -cardiology consult  -cycle trops and CPK  -PT and OT eval   -TSH and vitamin b12 level   -TTE       Fall/  Acute subdural hematoma at the right vertex  -admitted under SICU  -neurochecks   -Neurosurgery eval   -SCDs for DVT ppx  -Pain control  -PT evaluation   -speech therapy eval   -pain meds and DVT prophylaxis    -wound care as has laceration on occipital region  -Tetanus shot  -serial imaging   -pain meds   -DVT ppx  as per Primary team   -Holding asa and Eliquis  -if worsening neurological status or repeat imaging shows worsening bleeding might need single donor platelets and mixed cryo  on Keppra for seizure prophylaxis   Fall risk and precautions.     Hx of Dementia: would continue with Donepezil and Memantine    Hx of Insomnia: Will continue with Melatonin    Hx of Anxiety: will continue with Sertraline 50mg daily.     Hx of HTN and Afib: will continue with Atenolol 50 mg daily with holding parameters, Eliquis is on hold    Hx of HLD: will continue with Atorvastatin 40mg daily.     Hx of Peripheral edema/? hx of CHF: on lasix, will hold it for now     Hx of Prostate Ca: Will monitor for rentention.    Plan of care discuss with Trauma team.

## 2024-01-29 NOTE — H&P ADULT - ASSESSMENT
Assessment:  Assessment: Patient is an 87 y/o male with a hx of recurrent falls as well as a-fib on eliquis presenting after a fall this morning with headstrike. Found to have 4-5 mm SDH. Last dose of eliquis and aspirin were this morning.    Plan:  -Dispo pending NSGY evaluation  -Possible reversal for eliquis   -f/u remainder of trauma labs/scans  -To see with attending  Assessment: Patient is an 87 y/o male with a hx of recurrent falls as well as a-fib on eliquis presenting after a fall this morning with headstrike. Found to have 4-5 mm SDH. Last dose of eliquis and aspirin were this morning.    Plan:  -Admit to SICU  -q1 neurochecks per SICU  -Repeat CT in 6 hours, if worse then reversal is recommended by NSGY  -DVT ppx with SCDs  -Holding asa and coumadin  -MM pain control  -Head lac repaired with staples, continue local wound care  -Remainder of care per SICU  -Discussed with surgical attending and SICU team Assessment: Patient is an 89 y/o male with a hx of recurrent falls as well as a-fib on eliquis presenting after a fall this morning with headstrike. Found to have 4-5 mm SDH. Last dose of eliquis and aspirin were this morning.    Plan:  -Admit to SICU  -q1 neurochecks per SICU  -Repeat CT in 6 hours, if worse then reversal is recommended by NSGY  -Age indeterminant T5 VB fracture, no ttp or pain reported by patient   -DVT ppx with SCDs  -Holding asa and coumadin  -MM pain control  -Head lac repaired with staples, continue local wound care  -Remainder of care per SICU  -Discussed with surgical attending and SICU team

## 2024-01-29 NOTE — CONSULT NOTE ADULT - SUBJECTIVE AND OBJECTIVE BOX
Patient is a 88y old  Male who presents with a chief complaint of Fall, SDH (29 Jan 2024 13:01)      HISTORY OF PRESENT ILLNESS:   89 yo male with A. Fib on Eliquis and ASA, h/o of dementia resides at the [facility], and HTn who presents after a confusion and a fall today. Reportedly patient fell yesterday, presented to the hospital had a CTH done which was negative. Patient reportedly went back to his nursing home, was confused and had a GLF  with head strike. Patient seen and examined at bedside NAD. Patient denies any headaches or weakness at this time.     REVIEW OF SYSTEMS  Negative except as noted in HPI  Confusion s/p a fall    HOME MEDICATIONS:  Home Medications:  Aspir 81 oral delayed release tablet: 1 tab(s) orally once a day (29 Jan 2024 13:23)  atenolol 25 mg oral tablet: 1 tab(s) orally once a day (29 Jan 2024 13:23)  atorvastatin 40 mg oral tablet: 1 tab(s) orally once a day (at bedtime) (29 Jan 2024 13:23)  donepezil 5 mg oral tablet: 1 tab(s) orally once a day At dinner (29 Jan 2024 13:23)  Eliquis 5 mg oral tablet: 1 tab(s) orally 2 times a day (29 Jan 2024 13:23)  furosemide 20 mg oral tablet: 1 tab(s) orally once a day (29 Jan 2024 13:23)  melatonin 3 mg oral tablet: 1 tab(s) orally once a day (at bedtime) (29 Jan 2024 13:23)  memantine 5 mg oral tablet: 1 tab(s) orally 2 times a day (29 Jan 2024 13:23)  sertraline 50 mg oral tablet: 1 tab(s) orally once a day (29 Jan 2024 13:23)      Vital Signs Last 24 Hrs  T(C): 36.6 (29 Jan 2024 12:03), Max: 36.6 (29 Jan 2024 12:03)  T(F): 97.9 (29 Jan 2024 12:03), Max: 97.9 (29 Jan 2024 12:03)  HR: 83 (29 Jan 2024 12:03) (83 - 100)  BP: 147/64 (29 Jan 2024 11:33) (147/64 - 147/64)  BP(mean): --  RR: 12 (29 Jan 2024 11:33) (12 - 12)  SpO2: 93% (29 Jan 2024 11:33) (93% - 93%)    Parameters below as of 29 Jan 2024 11:33  Patient On (Oxygen Delivery Method): room air    PHYSICAL EXAM:  GENERAL: NAD  HEAD:  (+) occipital laceration  ALLA COMA SCORE: E- V- M- = 15  MENTAL STATUS: AAO x3; with intermittent confusion and forgetfulness, Awake, Opens eyes spontaneously. Appropriately conversant without aphasia, following simple commands  CRANIAL NERVES: PERRL. EOMI without nystagmus. Facial sensation intact V1-3 distribution b/l. Face symmetric w/ normal eye closure and smile, tongue midline  REFLEXES: PERRL  MOTOR: strength 5/5 b/l upper and lower extremities, no drift  SENSATION: grossly intact to light touch all extremities  SKIN: Warm, dry; no rashes or lesions    LABS:                        10.1   5.93  )-----------( 175      ( 29 Jan 2024 11:55 )             31.4     01-29    135  |  100  |  19.1  ----------------------------<  127<H>  4.2   |  26.0  |  0.94    Ca    8.2<L>      29 Jan 2024 11:55    TPro  5.4<L>  /  Alb  3.3  /  TBili  0.5  /  DBili  x   /  AST  14  /  ALT  12  /  AlkPhos  88  01-29    PT/INR - ( 29 Jan 2024 11:55 )   PT: 18.4 sec;   INR: 1.68 ratio         PTT - ( 29 Jan 2024 11:55 )  PTT:35.9 sec  Urinalysis Basic - ( 29 Jan 2024 11:55 )    Color: x / Appearance: x / SG: x / pH: x  Gluc: 127 mg/dL / Ketone: x  / Bili: x / Urobili: x   Blood: x / Protein: x / Nitrite: x   Leuk Esterase: x / RBC: x / WBC x   Sq Epi: x / Non Sq Epi: x / Bacteria: x    RADIOLOGY & ADDITIONAL STUDIES:  < from: CT Head No Cont (01.29.24 @ 11:43) >  IMPRESSION:    CT BRAIN  1. Acute subdural hematoma at the right vertex, measuring 4-5 mm in   maximal thickness. No underlying mass effect orvasogenic edema. No acute   calvarial fracture visualized.  2. Additional stable findings, described in detail above.    CT OF SPINE  1. No significant change.  2. No evidence of acute fracture.  3. Straightening of lordosis can be seen in the setting of muscle spasm.  4. Additional findings, including those degenerative, described in detail   above.    Findings discussed with Dr. Kimble at 12:16 PM the day of this exam.    --- End of Report ---    BIJAN DICKSON M.D., ATTENDING RADIOLOGIST  This document has been electronically signed. Jan 29 2024 12:17PM    < end of copied text >      CAPRINI SCORE [CLOT]:  Patient has an estimated Caprini score of greater than 5.  However, the patient's unique clinical situation will be addressed in an individual manner to determine appropriate anticoagulation treatment, if any. Patient is a 88y old  Male who presents with a chief complaint of Fall, SDH (29 Jan 2024 13:01)    HISTORY OF PRESENT ILLNESS:   89 yo male with A. Fib on Eliquis and ASA, h/o of dementia resides at the [facility], and HTn who presents after a confusion and a fall today. Reportedly patient fell yesterday, presented to the hospital had a CTH done which was negative. Patient reportedly went back to his nursing home, was confused and had a GLF  with head strike. Patient seen and examined at bedside NAD. Patient denies any headaches or weakness at this time.     REVIEW OF SYSTEMS  Negative except as noted in HPI  Confusion s/p a fall    HOME MEDICATIONS:  Home Medications:  Aspir 81 oral delayed release tablet: 1 tab(s) orally once a day (29 Jan 2024 13:23)  atenolol 25 mg oral tablet: 1 tab(s) orally once a day (29 Jan 2024 13:23)  atorvastatin 40 mg oral tablet: 1 tab(s) orally once a day (at bedtime) (29 Jan 2024 13:23)  donepezil 5 mg oral tablet: 1 tab(s) orally once a day At dinner (29 Jan 2024 13:23)  Eliquis 5 mg oral tablet: 1 tab(s) orally 2 times a day (29 Jan 2024 13:23)  furosemide 20 mg oral tablet: 1 tab(s) orally once a day (29 Jan 2024 13:23)  melatonin 3 mg oral tablet: 1 tab(s) orally once a day (at bedtime) (29 Jan 2024 13:23)  memantine 5 mg oral tablet: 1 tab(s) orally 2 times a day (29 Jan 2024 13:23)  sertraline 50 mg oral tablet: 1 tab(s) orally once a day (29 Jan 2024 13:23)      Vital Signs Last 24 Hrs  T(C): 36.6 (29 Jan 2024 12:03), Max: 36.6 (29 Jan 2024 12:03)  T(F): 97.9 (29 Jan 2024 12:03), Max: 97.9 (29 Jan 2024 12:03)  HR: 83 (29 Jan 2024 12:03) (83 - 100)  BP: 147/64 (29 Jan 2024 11:33) (147/64 - 147/64)  BP(mean): --  RR: 12 (29 Jan 2024 11:33) (12 - 12)  SpO2: 93% (29 Jan 2024 11:33) (93% - 93%)    Parameters below as of 29 Jan 2024 11:33  Patient On (Oxygen Delivery Method): room air    PHYSICAL EXAM:  GENERAL: NAD  HEAD:  (+) occipital laceration  ALLA COMA SCORE: E- V- M- = 15  MENTAL STATUS: AAO x3; with intermittent confusion and forgetfulness, Awake, Opens eyes spontaneously. Appropriately conversant without aphasia, following simple commands  CRANIAL NERVES: PERRL. EOMI without nystagmus. Facial sensation intact V1-3 distribution b/l. Face symmetric w/ normal eye closure and smile, tongue midline  REFLEXES: PERRL  MOTOR: strength 5/5 b/l upper and lower extremities, no drift  SENSATION: grossly intact to light touch all extremities  SKIN: Warm, dry; no rashes or lesions    LABS:                        10.1   5.93  )-----------( 175      ( 29 Jan 2024 11:55 )             31.4     01-29    135  |  100  |  19.1  ----------------------------<  127<H>  4.2   |  26.0  |  0.94    Ca    8.2<L>      29 Jan 2024 11:55    TPro  5.4<L>  /  Alb  3.3  /  TBili  0.5  /  DBili  x   /  AST  14  /  ALT  12  /  AlkPhos  88  01-29    PT/INR - ( 29 Jan 2024 11:55 )   PT: 18.4 sec;   INR: 1.68 ratio         PTT - ( 29 Jan 2024 11:55 )  PTT:35.9 sec  Urinalysis Basic - ( 29 Jan 2024 11:55 )    Color: x / Appearance: x / SG: x / pH: x  Gluc: 127 mg/dL / Ketone: x  / Bili: x / Urobili: x   Blood: x / Protein: x / Nitrite: x   Leuk Esterase: x / RBC: x / WBC x   Sq Epi: x / Non Sq Epi: x / Bacteria: x    RADIOLOGY & ADDITIONAL STUDIES:  < from: CT Head No Cont (01.29.24 @ 11:43) >  IMPRESSION:    CT BRAIN  1. Acute subdural hematoma at the right vertex, measuring 4-5 mm in   maximal thickness. No underlying mass effect orvasogenic edema. No acute   calvarial fracture visualized.  2. Additional stable findings, described in detail above.    CT OF SPINE  1. No significant change.  2. No evidence of acute fracture.  3. Straightening of lordosis can be seen in the setting of muscle spasm.  4. Additional findings, including those degenerative, described in detail   above.    Findings discussed with Dr. Kimble at 12:16 PM the day of this exam.    --- End of Report ---    BIJAN DICKSON M.D., ATTENDING RADIOLOGIST  This document has been electronically signed. Jan 29 2024 12:17PM    < end of copied text >      CAPRINI SCORE [CLOT]:  Patient has an estimated Caprini score of greater than 5.  However, the patient's unique clinical situation will be addressed in an individual manner to determine appropriate anticoagulation treatment, if any.

## 2024-01-29 NOTE — CONSULT NOTE ADULT - SUBJECTIVE AND OBJECTIVE BOX
89 y/o male with Hx of HTN, HLD, prostate cancer, dementia and afib on eliquis presenting after a mechanical fall this morning. Per EMS the patient was walking in the hallway when he fell backwards and struck his head against the wall/the floor. He was noted to possibly have some confusion as compared to his baseline per his nursing facility, He has a small occipital head lac that is not actively bleeding, imaging in ED done showed Acute subdural hematoma at the right vertex, measuring 4-5 mm in maximal thickness. No underlying mass effect or vasogenic edema. No acute calvarial fracture visualized, admitted under Trauma Service and was seen by Neurosurgery team, medicine consult for Claudette Trauma eval       Allergies:  	No Known Allergies:     Home Medications:   to be obtained        Social History:  Not a smoker   drinker or using any drugs         89 y/o male with Hx of HTN, HLD, prostate cancer, dementia and afib on eliquis presenting after a mechanical fall this morning. Per EMS the patient was walking in the hallway when he fell backwards and struck his head against the wall/the floor. He was noted to possibly have some confusion as compared to his baseline per his nursing facility, He has a small occipital head lac that is not actively bleeding, imaging in ED done showed Acute subdural hematoma at the right vertex, measuring 4-5 mm in maximal thickness. No underlying mass effect or vasogenic edema. No acute calvarial fracture visualized, admitted under Trauma Service and was seen by Neurosurgery team, medicine consult for Claudette Trauma eval       Allergies:  	No Known Allergies:     Home Medications:   to be obtained        Social History:  Not a smoker   drinker or using any drugs     ELSI CASTAÑEDA    [id number]    88y      Male    Patient is a 88y old  Male who presents with a chief complaint of Fall, SDH (29 Jan 2024 16:03)      INTERVAL HPI/OVERNIGHT EVENTS:    REVIEW OF SYSTEMS:    CONSTITUTIONAL: No fever, fatigue  RESPIRATORY: No cough, No shortness of breath  CARDIOVASCULAR: No chest pain, palpitations  GASTROINTESTINAL: No abdominal, No nausea, vomiting  NEUROLOGICAL: No headaches,  loss of strength.  MISCELLANEOUS: No joint swelling or pain       Vital Signs Last 24 Hrs  T(C): 36.7 (29 Jan 2024 20:14), Max: 36.7 (29 Jan 2024 14:21)  T(F): 98 (29 Jan 2024 20:14), Max: 98.1 (29 Jan 2024 14:21)  HR: 103 (29 Jan 2024 19:37) (83 - 103)  BP: 133/84 (29 Jan 2024 19:37) (133/84 - 169/84)  BP(mean): --  RR: 18 (29 Jan 2024 19:37) (12 - 18)  SpO2: 96% (29 Jan 2024 19:37) (93% - 100%)    Parameters below as of 29 Jan 2024 19:37  Patient On (Oxygen Delivery Method): room air        PHYSICAL EXAM:    GENERAL: Elderly male looking pleasantly confused   HEENT: has scalp laceration at occipital region   NECK: has neck collar on   CHEST/LUNG: Clear to auscultate bilaterally; No wheezing  HEART: S1S2+, Regular rate and rhythm; No murmurs  ABDOMEN: Soft, Nontender, Nondistended; Bowel sounds present  EXTREMITIES:  1+ Peripheral Pulses, No edema  SKIN: No rashes or lesions  NEURO: pleasantly confused, following commends     LABS:                        10.1   5.93  )-----------( 175      ( 29 Jan 2024 11:55 )             31.4     01-29    135  |  100  |  19.1  ----------------------------<  127<H>  4.2   |  26.0  |  0.94    Ca    8.2<L>      29 Jan 2024 11:55    TPro  5.4<L>  /  Alb  3.3  /  TBili  0.5  /  DBili  x   /  AST  14  /  ALT  12  /  AlkPhos  88  01-29    PT/INR - ( 29 Jan 2024 11:55 )   PT: 18.4 sec;   INR: 1.68 ratio         PTT - ( 29 Jan 2024 11:55 )  PTT:35.9 sec    I&O's Summary      MEDICATIONS  (STANDING):  atorvastatin 40 milliGRAM(s) Oral at bedtime  ceFAZolin  Injectable. 2000 milliGRAM(s) IV Push every 8 hours  donepezil 5 milliGRAM(s) Oral at bedtime  furosemide    Tablet 20 milliGRAM(s) Oral daily  levETIRAcetam 250 milliGRAM(s) Oral two times a day  melatonin 3 milliGRAM(s) Oral at bedtime  memantine 5 milliGRAM(s) Oral daily  metoprolol tartrate Injectable 2.5 milliGRAM(s) IV Push every 6 hours  potassium chloride    Tablet ER 10 milliEquivalent(s) Oral daily  sertraline 50 milliGRAM(s) Oral daily    MEDICATIONS  (PRN):

## 2024-01-29 NOTE — H&P ADULT - HISTORY OF PRESENT ILLNESS
H&P    Patient is an 87 y/o male with a PMH of HTN, dementia and afib on eliquis presenting after a mechanical fall this morning. Per EMS the patient was walking in the hallway when he fell backwards and struck his head against the wall/the floor. He was noted to have some increased confusion as compared to his baseline per his nursing facility. Upon arrival he has no complaints and denies headache or pain anywhere, He has a small occipital head lac that is not actively bleeding.    Primary:   Airway intact  Bilateral breath sounds   Palpable central pulses  GCS 13 (E3, M6, V4), pupils 3mm and reactive     Secondary: Only positive for small occipital head lac that is not actively bleeding     H&P    Patient is an 87 y/o male with a PMH of HTN, HLD, prostate cancer, dementia and afib on eliquis presenting after a mechanical fall this morning. Per EMS the patient was walking in the hallway when he fell backwards and struck his head against the wall/the floor. He was noted to possibly have some confusion as compared to his baseline per his nursing facility. Upon arrival he has no complaints and denies headache or pain anywhere, He has a small occipital head lac that is not actively bleeding.     Patient seen at the bedside and evaluated. After speaking more with the facility the patient is mentating at his baseline and his last doses of aspirin and eliquis were this morning.     Primary:   Airway intact  Bilateral breath sounds   Palpable central pulses  GCS 13 (E3, M6, V4), pupils 3mm and reactive     Secondary: Only positive for small occipital head lac that is not actively bleeding

## 2024-01-29 NOTE — PHARMACOTHERAPY INTERVENTION NOTE - COMMENTS
Referenced facility records to obtain medication list. On Eliquis 5 mG BID, last dose 8am 1/29. Outpatient Medication Review updated.    HOME MEDICATIONS:  acetaminophen 325 mg oral tablet: 2 tab(s) orally every 6 hours as needed for  mild pain (29 Jan 2024 16:55)  aspirin 81 mg oral tablet, chewable: 1 tab(s) chewed once a day (29 Jan 2024 16:55)  atenolol 25 mg oral tablet: 1 tab(s) orally once a day (29 Jan 2024 16:55)  atorvastatin 40 mg oral tablet: 1 tab(s) orally once a day (at bedtime) (29 Jan 2024 16:55)  donepezil 5 mg oral tablet: 1 tab(s) orally once a day At dinner (29 Jan 2024 16:55)  Eliquis 5 mg oral tablet: 1 tab(s) orally 2 times a day (29 Jan 2024 16:55)  furosemide 20 mg oral tablet: 1 tab(s) orally once a day (29 Jan 2024 16:55)  magnesium oxide 400 mg oral tablet: 1 tab(s) orally once a day (29 Jan 2024 16:53)  melatonin 3 mg oral tablet: 1 tab(s) orally once a day (at bedtime) (in combination with 5 mG tablet for total 7 mG per dose) (29 Jan 2024 16:55)  memantine 5 mg oral tablet: 1 tab(s) orally 2 times a day (29 Jan 2024 16:55)  Potassium Chloride (Eqv-Klor-Con 10) 10 mEq oral tablet, extended release: 1 tab(s) orally once a day (29 Jan 2024 16:55)  sertraline 50 mg oral tablet: 1 tab(s) orally once a day (29 Jan 2024 16:55)

## 2024-01-29 NOTE — ED ADULT NURSE REASSESSMENT NOTE - AS TEMP SITE
----- Message from Sarah Hernandez sent at 3/25/2023 10:45 AM CDT -----  Regarding: Entresto  Because Dr. Augustin doubled my dosage of Entresto I am almost out of the 50mg.   Can you please send the new scrip for 107mg to [facility] - and explain that I've been doubling up since Feb?   I will need these new pills before 4/26.    Thank you  
oral
oral

## 2024-01-29 NOTE — H&P ADULT - NSHPPHYSICALEXAM_GEN_ALL_CORE
VITALS:   T(C): 36.6 (01-29-24 @ 12:03), Max: 36.6 (01-29-24 @ 12:03)  HR: 83 (01-29-24 @ 12:03) (83 - 100)  BP: 147/64 (01-29-24 @ 11:33) (147/64 - 147/64)  RR: 12 (01-29-24 @ 11:33) (12 - 12)  SpO2: 93% (01-29-24 @ 11:33) (93% - 93%)    GENERAL: NAD, lying in bed comfortably  HEAD:  4 cm linear lac to posterior scalp that is not actively bleeding   EYES: EOMI, PERRLA, conjunctiva and sclera clear  ENT: Moist mucous membranes  NECK: Supple, No JVD  CHEST/LUNG:  Unlabored respirations  HEART: Regular rate and rhythm  ABDOMEN: BSx4; Soft, nontender, nondistended  EXTREMITIES:  2+ Peripheral Pulses  NERVOUS SYSTEM:  A&Ox1, no focal deficits, GCS 13  SKIN: No rashes or lesions aside from above VITALS:   T(C): 36.6 (01-29-24 @ 12:03), Max: 36.6 (01-29-24 @ 12:03)  HR: 83 (01-29-24 @ 12:03) (83 - 100)  BP: 147/64 (01-29-24 @ 11:33) (147/64 - 147/64)  RR: 12 (01-29-24 @ 11:33) (12 - 12)  SpO2: 93% (01-29-24 @ 11:33) (93% - 93%)    GENERAL: NAD, lying in bed comfortably  HEAD:  4 cm linear lac to posterior scalp that is not actively bleeding   EYES: EOMI, PERRLA, conjunctiva and sclera clear  ENT: Moist mucous membranes  NECK: Supple, No JVD  CHEST/LUNG:  Unlabored respirations  HEART: Regular rate and rhythm  ABDOMEN: Soft, nontender, nondistended, no rebound or guarding   EXTREMITIES:  2+ Peripheral Pulses  NERVOUS SYSTEM:  A&Ox1, no focal deficits, GCS 13  SKIN: No rashes or lesions aside from above VITALS:   T(C): 36.6 (01-29-24 @ 12:03), Max: 36.6 (01-29-24 @ 12:03)  HR: 83 (01-29-24 @ 12:03) (83 - 100)  BP: 147/64 (01-29-24 @ 11:33) (147/64 - 147/64)  RR: 12 (01-29-24 @ 11:33) (12 - 12)  SpO2: 93% (01-29-24 @ 11:33) (93% - 93%)    GENERAL: NAD, lying in bed comfortably  HEAD:  4 cm linear lac to posterior scalp that is not actively bleeding, some ecchymosis over anterior neck  EYES: EOMI, PERRLA, conjunctiva and sclera clear  ENT: Moist mucous membranes  NECK: Supple, No JVD  CHEST/LUNG:  Unlabored respirations  HEART: Regular rate and rhythm  ABDOMEN: Soft, nontender, nondistended, no rebound or guarding   EXTREMITIES:  2+ Peripheral Pulses  NERVOUS SYSTEM:  A&Ox1, no focal deficits, GCS 13  SKIN: No rashes or lesions aside from above VITALS:   T(C): 36.6 (01-29-24 @ 12:03), Max: 36.6 (01-29-24 @ 12:03)  HR: 83 (01-29-24 @ 12:03) (83 - 100)  BP: 147/64 (01-29-24 @ 11:33) (147/64 - 147/64)  RR: 12 (01-29-24 @ 11:33) (12 - 12)  SpO2: 93% (01-29-24 @ 11:33) (93% - 93%)    GENERAL: NAD, lying in bed comfortably  HEAD:  4 cm linear lac to posterior scalp that is not actively bleeding, some ecchymosis over anterior neck  EYES: EOMI, PERRLA, conjunctiva and sclera clear  ENT: Moist mucous membranes  NECK: Supple, No JVD, no c/t/l spine ttp  CHEST/LUNG:  Unlabored respirations  HEART: Regular rate and rhythm  ABDOMEN: Soft, nontender, nondistended, no rebound or guarding   EXTREMITIES:  2+ Peripheral Pulses  NERVOUS SYSTEM:  A&Ox1, no focal deficits, GCS 13  SKIN: No rashes or lesions aside from above

## 2024-01-29 NOTE — ED PROVIDER NOTE - ATTENDING CONTRIBUTION TO CARE
I, Felicitas Mcpherson DO, have personally provided 60 minutes of critical care time exclusive of time spent on separately billable procedures. Time includes review of laboratory data, radiology results, discussion with consultants, and monitoring for potential decompensation. Interventions were performed as documented above.     I personally saw the patient with the resident, and completed the key components of the history and physical exam. I then discussed the management plan with the resident.    88-year-old male with past medical history of dementia, hypertension, A-fib on Eliquis presents from assisted living after fall.  Patient was here yesterday for fall had a head CT at the time which was negative.  Patient denies any headache, nausea or vomiting or vision changes.    NAD, posterior left occipitoparietal laceration, ecchymosis to underside of chin, across left chest wall, A&O x 3, no focal neuro exam.    patient found to have a subdural hematoma, admitted to SICU.

## 2024-01-29 NOTE — ED ADULT TRIAGE NOTE - CHIEF COMPLAINT QUOTE
pt c/o fall unwitnessed possible syncope, hx of a-fib and is on elequis. abrasion to the forehead. MD called to bedside. Priority CT called. from the [facility].

## 2024-01-29 NOTE — ED ADULT NURSE REASSESSMENT NOTE - NURSING NEURO ORIENTATION
disoriented to place
disoriented to place
disoriented to place/disoriented to time/situation
disoriented to place
disoriented to place/disoriented to time/situation

## 2024-01-29 NOTE — ED ADULT NURSE NOTE - CHIEF COMPLAINT QUOTE
pt c/o fall unwitnessed possible syncope, hx of a-fib and is on elequis. abrasion to the forehead. MD called to bedside. Priority CT called. from the [facility].

## 2024-01-29 NOTE — ED ADULT NURSE REASSESSMENT NOTE - NURSING MUSC STRENGTH
hand grasp, leg strength strong and equal bilaterally

## 2024-01-29 NOTE — ED PROVIDER NOTE - PROGRESS NOTE DETAILS
Lamin: Due to the acuity of pt's condition and concern for intrathoracic/abdominal injuries the benefits of contrast use prior to Cr results outweigh the theoretical risks of contrast induced nephropathy. Lamin: Acute subdural per Radiologist. Neurosurgery called. Lamin: Acute subdural per Radiologist. Neurosurgery and trauma called.

## 2024-01-29 NOTE — ED ADULT NURSE REASSESSMENT NOTE - GLASGOW COMA SCALE: EYE OPENING
(E3) to speech
Counseling and Referral of Other Preventative  (Italic type indicates deductible and co-insurance are waived)    Patient Name: Marilee Townsend  Today's Date: 5/10/2023    Health Maintenance         Date Due Completion Date    TETANUS VACCINE Never done ---    Mammogram Never done ---    DEXA Scan Never done ---    Colorectal Cancer Screening 03/08/2024 3/8/2023    Lipid Panel 03/08/2028 3/8/2023          No orders of the defined types were placed in this encounter.    The following information is provided to all patients.  This information is to help you find resources for any of the problems found today that may be affecting your health:                Living healthy guide: [email or web address].[email or web address]      Understanding Diabetes: www.diabetes.org      Eating healthy: www.cdc.gov/healthyweight      CDC home safety checklist: www.cdc.gov/steadi/patient.html      Agency on Aging: www.goea.[place].[facility]      Alcoholics anonymous (AA): www.aa.org      Physical Activity: www.mariama.nih.gov/bg8jivd      Tobacco use: www.quitwithusla.org       Please call [employer] at [phone] to receive a rewards card for completing your Annual Wellness Visit. Thanks    
(E3) to speech

## 2024-01-29 NOTE — GOALS OF CARE CONVERSATION - ADVANCED CARE PLANNING - CONVERSATION DETAILS
Pt baseline dementia.  A&O X person only  Over the phone, our team discussed DX of traumatic head bleed  Prognosis good since very small but concerning due to ELiquis.  I asked if we could talk about what to do if things got worse.  She agreed.  I explained if bleed got worse, he may slow his breathing and require intubation, breathing tube, ventilator. She explained that in prior discussions, the patient expressed that he would not want to be on a ventilator, even to save his life and would rather be left in peace if this was required or if his heart stop.  He would not want CPR, Electricity or medications to attempt to bring him back to life.    We agreed on DNR/ DNI

## 2024-01-29 NOTE — H&P ADULT - ATTENDING COMMENTS
Patient w/ hx of dementia, GLFs while on eliquis for Afib, presents s/p GLF.   Exams w/ evidence of head lac (stapled); GCS 13 (opens eyes to voice + confusion: reportedly baseline per facility)  Workup included chest and pelvic xrays, CTs of head/c spine, chest/abdomen/pelvis: evidence of SDH; also w/ age-indeterminate T5 fracture without associated TTP (likely chronic).   --Hold AC; admit to SICU for neuro checks w/ repeat 6hr CT head per protocol  --f/u NSG recs: q1 neuro checks   --IS for atelectasis.   --Review home meds for meds as indicated

## 2024-01-29 NOTE — ED ADULT NURSE NOTE - NSFALLHARMRISKINTERV_ED_ALL_ED

## 2024-01-29 NOTE — H&P ADULT - NSHPLABSRESULTS_GEN_ALL_CORE
10.1   5.93  )-----------( 175      ( 29 Jan 2024 11:55 )             31.4   01-29    135  |  100  |  19.1  ----------------------------<  127<H>  4.2   |  26.0  |  0.94    Ca    8.2<L>      29 Jan 2024 11:55    TPro  5.4<L>  /  Alb  3.3  /  TBili  0.5  /  DBili  x   /  AST  14  /  ALT  12  /  AlkPhos  88  01-29    < from: CT Cervical Spine No Cont (01.29.24 @ 11:44) >    IMPRESSION:    CT BRAIN  1. Acute subdural hematoma at the right vertex, measuring 4-5 mm in   maximal thickness. No underlying mass effect orvasogenic edema. No acute   calvarial fracture visualized.  2. Additional stable findings, described in detail above.    CT OF SPINE  1. No significant change.  2. No evidence of acute fracture.  3. Straightening of lordosis can be seen in the setting of muscle spasm.  4. Additional findings, including those degenerative, described in detail   above.      < end of copied text >

## 2024-01-29 NOTE — CONSULT NOTE ADULT - ASSESSMENT
87 yo male on ASA and Eliquis who presents after a fall who was found to have a right vertex SDH     Plan:  -Case and plan discussed with Dr. Zaragoza  -Rpt CTH in 6 hours, if SDH increased reverse ASA and Eliquis  -Scan can be done sooner if clinically indicated  -Keppra 250mg BID x 7 days  -NPO on IVF   -q1 hour neurochecks q 24 hours 89 yo male on ASA and Eliquis who presents after a fall who was found to have a right vertex SDH . 6 hr head CT stable    Plan:  -Case and plan discussed with Dr. Zaragoza  -Follow-up official 6 hr head ct read, appears stable. Repeat 24 hour head CT.   -Scan can be done sooner if clinically indicated  -Keppra 250mg BID x 7 days  -NPO on IVF   -q1 hour neurochecks q 24 hours

## 2024-01-29 NOTE — CONSULT NOTE ADULT - SUPERVISING ATTENDING
I personally reviewed the patient's imaging. History and plan discussed with CONNIE Mac, agree with above.

## 2024-01-29 NOTE — ED PROVIDER NOTE - OBJECTIVE STATEMENT
88-year-old male with past medical history of dementia, hypertension, atrial fibrillation on Eliquis brought in by EMS from [facility] living [facility].  Per EMS patient was walking down the hallway, experiencing confusion, fell backwards and struck his head..  Appears to be more confused of his baseline per EMS, although has a history of dementia.  Of note patient was seen in the ER yesterday and a week and a half ago with recurrent falls.  Spoke with daughter Lis over the phone who states he has been having generalized weakness leading to falls, there was concern he would need a rehab facility.  Told he had an episode of nausea/vomiting. Not complaining of headaches, vision changes, chest pain, or abdominal pain.  Does have some bruising on the left side of his chest from a prior fall.

## 2024-01-29 NOTE — ED PROVIDER NOTE - CLINICAL SUMMARY MEDICAL DECISION MAKING FREE TEXT BOX
88-year-old male with recurrent falls brought in by EMS for occipital head strike with laceration/contusion, nausea/vomiting.  GCS of 13.  Priority CT called given anticoagulation and external signs of trauma.

## 2024-01-29 NOTE — ED PROVIDER NOTE - PHYSICAL EXAMINATION
General: NAD  Head: NC, AT  EENT: PERRLA, EOMI, no scleral icterus  Cardiac: rate normal, irregular rhythm, no apparent murmurs, 2+pitting edema b/l  Respiratory: CTABL, no respiratory distress   Abdomen: soft, ND, NT, nonperitonitic  MSK/Vascular: full ROM, soft compartments, warm extremities, ecchymosis left lateral aspect of the ribs  Neuro: AAOx3, confused and intermittent mumbling, GCS 13, sensation to light touch intact  Psych: cooperative

## 2024-01-30 DIAGNOSIS — I48.91 UNSPECIFIED ATRIAL FIBRILLATION: ICD-10-CM

## 2024-01-30 DIAGNOSIS — I50.9 HEART FAILURE, UNSPECIFIED: ICD-10-CM

## 2024-01-30 LAB
ANION GAP SERPL CALC-SCNC: 10 MMOL/L — SIGNIFICANT CHANGE UP (ref 5–17)
ANION GAP SERPL CALC-SCNC: 11 MMOL/L — SIGNIFICANT CHANGE UP (ref 5–17)
APTT BLD: 34.7 SEC — SIGNIFICANT CHANGE UP (ref 24.5–35.6)
BASOPHILS # BLD AUTO: 0.03 K/UL — SIGNIFICANT CHANGE UP (ref 0–0.2)
BASOPHILS NFR BLD AUTO: 0.5 % — SIGNIFICANT CHANGE UP (ref 0–2)
BUN SERPL-MCNC: 17 MG/DL — SIGNIFICANT CHANGE UP (ref 8–20)
BUN SERPL-MCNC: 17.4 MG/DL — SIGNIFICANT CHANGE UP (ref 8–20)
CALCIUM SERPL-MCNC: 8.5 MG/DL — SIGNIFICANT CHANGE UP (ref 8.4–10.5)
CALCIUM SERPL-MCNC: 8.6 MG/DL — SIGNIFICANT CHANGE UP (ref 8.4–10.5)
CHLORIDE SERPL-SCNC: 96 MMOL/L — SIGNIFICANT CHANGE UP (ref 96–108)
CHLORIDE SERPL-SCNC: 99 MMOL/L — SIGNIFICANT CHANGE UP (ref 96–108)
CK SERPL-CCNC: 62 U/L — SIGNIFICANT CHANGE UP (ref 30–200)
CO2 SERPL-SCNC: 24 MMOL/L — SIGNIFICANT CHANGE UP (ref 22–29)
CO2 SERPL-SCNC: 25 MMOL/L — SIGNIFICANT CHANGE UP (ref 22–29)
CREAT SERPL-MCNC: 0.87 MG/DL — SIGNIFICANT CHANGE UP (ref 0.5–1.3)
CREAT SERPL-MCNC: 0.9 MG/DL — SIGNIFICANT CHANGE UP (ref 0.5–1.3)
EGFR: 83 ML/MIN/1.73M2 — SIGNIFICANT CHANGE UP
EGFR: 84 ML/MIN/1.73M2 — SIGNIFICANT CHANGE UP
EOSINOPHIL # BLD AUTO: 0.07 K/UL — SIGNIFICANT CHANGE UP (ref 0–0.5)
EOSINOPHIL NFR BLD AUTO: 1.2 % — SIGNIFICANT CHANGE UP (ref 0–6)
GLUCOSE BLDC GLUCOMTR-MCNC: 120 MG/DL — HIGH (ref 70–99)
GLUCOSE SERPL-MCNC: 133 MG/DL — HIGH (ref 70–99)
GLUCOSE SERPL-MCNC: 148 MG/DL — HIGH (ref 70–99)
HCT VFR BLD CALC: 31.3 % — LOW (ref 39–50)
HGB BLD-MCNC: 10 G/DL — LOW (ref 13–17)
IMM GRANULOCYTES NFR BLD AUTO: 0.3 % — SIGNIFICANT CHANGE UP (ref 0–0.9)
INR BLD: 1.61 RATIO — HIGH (ref 0.85–1.18)
LYMPHOCYTES # BLD AUTO: 0.68 K/UL — LOW (ref 1–3.3)
LYMPHOCYTES # BLD AUTO: 11.7 % — LOW (ref 13–44)
MAGNESIUM SERPL-MCNC: 1.9 MG/DL — SIGNIFICANT CHANGE UP (ref 1.6–2.6)
MCHC RBC-ENTMCNC: 28.2 PG — SIGNIFICANT CHANGE UP (ref 27–34)
MCHC RBC-ENTMCNC: 31.9 GM/DL — LOW (ref 32–36)
MCV RBC AUTO: 88.2 FL — SIGNIFICANT CHANGE UP (ref 80–100)
MONOCYTES # BLD AUTO: 0.55 K/UL — SIGNIFICANT CHANGE UP (ref 0–0.9)
MONOCYTES NFR BLD AUTO: 9.5 % — SIGNIFICANT CHANGE UP (ref 2–14)
MRSA PCR RESULT.: SIGNIFICANT CHANGE UP
NEUTROPHILS # BLD AUTO: 4.44 K/UL — SIGNIFICANT CHANGE UP (ref 1.8–7.4)
NEUTROPHILS NFR BLD AUTO: 76.8 % — SIGNIFICANT CHANGE UP (ref 43–77)
OSMOLALITY SERPL: 286 MOSMOL/KG — SIGNIFICANT CHANGE UP (ref 280–301)
PHOSPHATE SERPL-MCNC: 2.7 MG/DL — SIGNIFICANT CHANGE UP (ref 2.4–4.7)
PLATELET # BLD AUTO: 177 K/UL — SIGNIFICANT CHANGE UP (ref 150–400)
POTASSIUM SERPL-MCNC: 4.3 MMOL/L — SIGNIFICANT CHANGE UP (ref 3.5–5.3)
POTASSIUM SERPL-MCNC: 5.3 MMOL/L — SIGNIFICANT CHANGE UP (ref 3.5–5.3)
POTASSIUM SERPL-SCNC: 4.3 MMOL/L — SIGNIFICANT CHANGE UP (ref 3.5–5.3)
POTASSIUM SERPL-SCNC: 5.3 MMOL/L — SIGNIFICANT CHANGE UP (ref 3.5–5.3)
PROTHROM AB SERPL-ACNC: 17.6 SEC — HIGH (ref 9.5–13)
RBC # BLD: 3.55 M/UL — LOW (ref 4.2–5.8)
RBC # FLD: 16.1 % — HIGH (ref 10.3–14.5)
S AUREUS DNA NOSE QL NAA+PROBE: SIGNIFICANT CHANGE UP
SODIUM SERPL-SCNC: 132 MMOL/L — LOW (ref 135–145)
SODIUM SERPL-SCNC: 133 MMOL/L — LOW (ref 135–145)
TROPONIN T, HIGH SENSITIVITY RESULT: 17 NG/L — SIGNIFICANT CHANGE UP (ref 0–51)
TROPONIN T, HIGH SENSITIVITY RESULT: 18 NG/L — SIGNIFICANT CHANGE UP (ref 0–51)
TSH SERPL-MCNC: 1.19 UIU/ML — SIGNIFICANT CHANGE UP (ref 0.27–4.2)
VIT B12 SERPL-MCNC: 357 PG/ML — SIGNIFICANT CHANGE UP (ref 232–1245)
WBC # BLD: 5.79 K/UL — SIGNIFICANT CHANGE UP (ref 3.8–10.5)
WBC # FLD AUTO: 5.79 K/UL — SIGNIFICANT CHANGE UP (ref 3.8–10.5)

## 2024-01-30 PROCEDURE — 70450 CT HEAD/BRAIN W/O DYE: CPT | Mod: 26

## 2024-01-30 PROCEDURE — 99233 SBSQ HOSP IP/OBS HIGH 50: CPT

## 2024-01-30 PROCEDURE — 99232 SBSQ HOSP IP/OBS MODERATE 35: CPT

## 2024-01-30 PROCEDURE — 99231 SBSQ HOSP IP/OBS SF/LOW 25: CPT | Mod: FS

## 2024-01-30 RX ORDER — ENOXAPARIN SODIUM 100 MG/ML
30 INJECTION SUBCUTANEOUS EVERY 12 HOURS
Refills: 0 | Status: DISCONTINUED | OUTPATIENT
Start: 2024-01-30 | End: 2024-01-30

## 2024-01-30 RX ORDER — QUETIAPINE FUMARATE 200 MG/1
25 TABLET, FILM COATED ORAL ONCE
Refills: 0 | Status: COMPLETED | OUTPATIENT
Start: 2024-01-30 | End: 2024-01-30

## 2024-01-30 RX ORDER — SODIUM CHLORIDE 9 MG/ML
1000 INJECTION INTRAMUSCULAR; INTRAVENOUS; SUBCUTANEOUS
Refills: 0 | Status: DISCONTINUED | OUTPATIENT
Start: 2024-01-30 | End: 2024-01-31

## 2024-01-30 RX ORDER — MAGNESIUM SULFATE 500 MG/ML
2 VIAL (ML) INJECTION ONCE
Refills: 0 | Status: COMPLETED | OUTPATIENT
Start: 2024-01-30 | End: 2024-01-30

## 2024-01-30 RX ORDER — SODIUM,POTASSIUM PHOSPHATES 278-250MG
1 POWDER IN PACKET (EA) ORAL ONCE
Refills: 0 | Status: COMPLETED | OUTPATIENT
Start: 2024-01-30 | End: 2024-01-30

## 2024-01-30 RX ORDER — CHLORHEXIDINE GLUCONATE 213 G/1000ML
1 SOLUTION TOPICAL DAILY
Refills: 0 | Status: DISCONTINUED | OUTPATIENT
Start: 2024-01-30 | End: 2024-01-30

## 2024-01-30 RX ORDER — QUETIAPINE FUMARATE 200 MG/1
12.5 TABLET, FILM COATED ORAL AT BEDTIME
Refills: 0 | Status: DISCONTINUED | OUTPATIENT
Start: 2024-01-30 | End: 2024-02-01

## 2024-01-30 RX ADMIN — QUETIAPINE FUMARATE 12.5 MILLIGRAM(S): 200 TABLET, FILM COATED ORAL at 21:28

## 2024-01-30 RX ADMIN — DONEPEZIL HYDROCHLORIDE 5 MILLIGRAM(S): 10 TABLET, FILM COATED ORAL at 21:46

## 2024-01-30 RX ADMIN — Medication 10 MILLIEQUIVALENT(S): at 12:40

## 2024-01-30 RX ADMIN — LEVETIRACETAM 250 MILLIGRAM(S): 250 TABLET, FILM COATED ORAL at 18:32

## 2024-01-30 RX ADMIN — Medication 2000 MILLIGRAM(S): at 14:01

## 2024-01-30 RX ADMIN — QUETIAPINE FUMARATE 25 MILLIGRAM(S): 200 TABLET, FILM COATED ORAL at 01:58

## 2024-01-30 RX ADMIN — LEVETIRACETAM 250 MILLIGRAM(S): 250 TABLET, FILM COATED ORAL at 05:45

## 2024-01-30 RX ADMIN — Medication 20 MILLIGRAM(S): at 05:46

## 2024-01-30 RX ADMIN — ATORVASTATIN CALCIUM 40 MILLIGRAM(S): 80 TABLET, FILM COATED ORAL at 21:28

## 2024-01-30 RX ADMIN — Medication 3 MILLIGRAM(S): at 21:28

## 2024-01-30 RX ADMIN — MEMANTINE HYDROCHLORIDE 5 MILLIGRAM(S): 10 TABLET ORAL at 12:41

## 2024-01-30 RX ADMIN — Medication 2000 MILLIGRAM(S): at 05:45

## 2024-01-30 RX ADMIN — SERTRALINE 50 MILLIGRAM(S): 25 TABLET, FILM COATED ORAL at 12:40

## 2024-01-30 RX ADMIN — Medication 2000 MILLIGRAM(S): at 21:28

## 2024-01-30 RX ADMIN — Medication 1 PACKET(S): at 05:45

## 2024-01-30 RX ADMIN — Medication 25 MILLIGRAM(S): at 18:32

## 2024-01-30 RX ADMIN — Medication 25 GRAM(S): at 05:46

## 2024-01-30 RX ADMIN — SODIUM CHLORIDE 50 MILLILITER(S): 9 INJECTION INTRAMUSCULAR; INTRAVENOUS; SUBCUTANEOUS at 21:29

## 2024-01-30 NOTE — PHYSICAL THERAPY INITIAL EVALUATION ADULT - LONG TERM MEMORY, REHAB EVAL
impaired Tranexamic Acid Pregnancy And Lactation Text: It is unknown if this medication is safe during pregnancy or breast feeding.

## 2024-01-30 NOTE — CONSULT NOTE ADULT - PROBLEM SELECTOR RECOMMENDATION 9
- pt appears fluid overloaded however oxygen saturation is stable on room air in no acute distress.   - check ProBNP   - GDMT: start diuresis with lasix 40mg x1, will assess if pt is able to tolerate Lasix. c/w metoprolol 25 mg BID PO.   - TTE EF 70-75%, elevated filling pressure. severe pulmonary artery pressure  - Monitor on telemetry.  Strict i/o and daily weights.  Keep K > 4, Mg > 2.  Monitor renal function with ongoing diuresis.

## 2024-01-30 NOTE — PHYSICAL THERAPY INITIAL EVALUATION ADULT - ADDITIONAL COMMENTS
Pt is a poor historian, unable to provide PLOF or social hx. Pt has had several ED visits recently for falls however, no previous PT evaluation to refer to. Per previous SW consult, pt is from the hospitals (? dementia unit).

## 2024-01-30 NOTE — CHART NOTE - NSCHARTNOTEFT_GEN_A_CORE
Tertiary Trauma Survey (TTS)    Date of TTS: 01-30-24 @ 07:17                             Admit Date: 01-29-24 @ 14:12        List Injuries Identified to Date: SDH, R LE cellulitis    List Operative and Interventional Radiological Procedures: N/A      Physical Exam:    Neuro: GCS 14, SANDOVAL, no focal neuro deficits, mild intentional tremor    HEENT: small occipital head lac, no active bleeding, trachea midline, no JVD, no cervical tenderness     Pulm/Chest: no chest wall tenderness, normal respiratory effort, bilateral chest rise, saturating 100% on room air     Cardiac: irregular rhythm regular rate    GI / Abdomen: soft, [id number], nondistended, no rebound tenderness or gaurding     Musculoskeletal / Extremities: B/L LE pitting edema, otherwise SANDOVAL and baseline strength seems to be intact     Integumentary: R lower leg cellulitis     RADIOLOGICAL FINDINGS REVIEW:  CT BRAIN  Acute subdural hematoma at the right vertex, measuring 4-5 mm in maximal thickness. No underlying mass effect or vasogenic edema. No acute calvarial fracture visualized.    CT OF SPINE  1. No significant change. 2. No evidence of acute fracture.  3. Straightening of lordosis can be seen in the setting of muscle spasm.    CT chest/abdomen/pelvis:   Moderate right-sided pleural effusion and small to moderate left-sided pleural effusion with partial bilateral lower lobe atelectasis.  Gallbladder wall edema and periportal edema; correlate clinically for acute viral syndrome. Large right lateral abdominal wall hernia.  Age indeterminate compression fracture T5 vertebral body.    Rpt CT head: Small right vertex extraaxial blood is stable. No new hemorrhage.  Left frontal extraaxial space more apparent, but lower density of probable small developing hygroma.    24 hour stability CT head pending    INCIDENTAL FINDINGS:    [ ] No    [x ] Yes, Findings are: abdominal wall hernia     [x ] Tertiary exam complete, there are no new injuries identified    [ ] Tertiary exam done, new injuries identified are:                [ ] Imaging ordered: Tertiary Trauma Survey (TTS)    Date of TTS: 01-30-24 @ 07:17                             Admit Date: 01-29-24 @ 14:12        List Injuries Identified to Date: SDH, R LE cellulitis    List Operative and Interventional Radiological Procedures: N/A      Physical Exam:    Neuro: GCS 14, SANDOVAL, no focal neuro deficits, mild intentional tremor    HEENT: small occipital head lac, no active bleeding, trachea midline, no JVD, no cervical tenderness     Pulm/Chest: no chest wall tenderness, normal respiratory effort, bilateral chest rise, saturating 100% on room air     Cardiac: irregular rhythm regular rate    GI / Abdomen: soft, [id number], nondistended, no rebound tenderness or guarding     Musculoskeletal / Extremities: B/L LE pitting edema, otherwise SANDOVAL and baseline strength seems to be intact     Integumentary: R lower leg cellulitis, left anterior chest wall bruising     RADIOLOGICAL FINDINGS REVIEW:  CT BRAIN  Acute subdural hematoma at the right vertex, measuring 4-5 mm in maximal thickness. No underlying mass effect or vasogenic edema. No acute calvarial fracture visualized.    CT OF SPINE  1. No significant change. 2. No evidence of acute fracture.  3. Straightening of lordosis can be seen in the setting of muscle spasm.    CT chest/abdomen/pelvis:   Moderate right-sided pleural effusion and small to moderate left-sided pleural effusion with partial bilateral lower lobe atelectasis.  Gallbladder wall edema and periportal edema; correlate clinically for acute viral syndrome. Large right lateral abdominal wall hernia.  Age indeterminate compression fracture T5 vertebral body.    Rpt CT head: Small right vertex extraaxial blood is stable. No new hemorrhage.  Left frontal extraaxial space more apparent, but lower density of probable small developing hygroma.    24 hour stability CT head pending    INCIDENTAL FINDINGS:    [ ] No    [x ] Yes, Findings are: abdominal wall hernia     [x ] Tertiary exam complete, there are no new injuries identified    [ ] Tertiary exam done, new injuries identified are:                [ ] Imaging ordered:

## 2024-01-30 NOTE — CONSULT NOTE ADULT - PROBLEM SELECTOR RECOMMENDATION 2
- hx of AFib on Eliquis.   - currently AFib rate controlled on telemetry   - c/w metoprolol 25 mg BID PO for rate control.   - [id number] 3    - Appreciate neurosurgery recs in regards to when it is appropriate to restart AC, awaiting repeat CTH at 6PM.   - Will start anticoagulation and ASA once cleared by neurosurgery - hx of AFib on Eliquis.   - currently AFib rate controlled on telemetry   - c/w metoprolol 25 mg BID PO for rate control.   - [id number] 3    - Appreciate neurosurgery recs in regards to when it is appropriate to restart AC, awaiting repeat CTH at 6PM.   - Will start anticoagulation and ASA once cleared by neurosurgery  - Plan discussed with daughter and is agreeable.

## 2024-01-30 NOTE — CONSULT NOTE ADULT - NS ATTEND AMEND GEN_ALL_CORE FT
87 y/o M with hx of HTN, HLD, prostate cancer, dementia, and AFib on Eliquis presenting after a mechanical fall in the nursing facility. Per EMS, pt was walking in the hallway where he fell backwards and struck his head against the wall/floor. He was noted to have some confusion in comparison to his baseline per nursing facility. Patient is found to have a small R frontal SDH.      Acute on chronic HFpEF? NYHA unknown, ACC C  persistent atrial fibrillation   chronic anticoagulation use  SDH  hypertension  hyperlipidemia  dementia  hx of prostate cancer      Patient has suspicion of growing SDH.  we talked at length with the patients daughter at bedside. The patient is a PhD in mathematics. We told her the data behind recurrent falls with warfarin which has been extrapolated using markov models with Xarelto and Apixiban. According to the data and standard available the patient would qualify for continued anticoagulation.    She stated that her consideration would be so long as he is monitored at rehab or in hospital with very minimal risk of falls she would appreciate anticoagulation and understands the risks vs. benefits of continuing it were he to be able to be safely returned to his area of residence with the independence that he had prior. She would want her physicians at that facility to take that decision with her based on his progression.    She is against invasive cardiac testing or measures and does reiterate his code status as well as her wishes to us.    For now we recommend avoiding AC if there is evidence of expanding bleed per Neurosurgery. Defer to them timing of restarting as this is a traumatic bleed.    He has mild hypervolemia. Recommend trial dose of IV lasix 40mg once and check a proBNP.    We will follow.

## 2024-01-30 NOTE — PHYSICAL THERAPY INITIAL EVALUATION ADULT - LEVEL OF INDEPENDENCE: SUPINE/SIT, REHAB EVAL
Pt requires dependent assist to move BLEs to EOB, Pt unable to Sit fully upright in bed 2*2 confusion, resisting assist to full seated position at EOB./unable to perform

## 2024-01-30 NOTE — PROGRESS NOTE ADULT - SUBJECTIVE AND OBJECTIVE BOX
INTERVAL HPI/OVERNIGHT EVENTS:  88y Male PMH HTN, HLD, prostate CA, dementia, Afib on Eliquis, also on ASA 81, presented to [facility] 1/28/24 s/p mechanical fall, CTH obtained negative, discharged back to nursing home, became confused so was brought back to ED 1/29/24 and found with small right frontal SDH. Repeat 6 hour CTH yesterday 1/29 and 24 hour CTH today 1/30 stable.     Vital Signs Last 24 Hrs  T(C): 36.3 (30 Jan 2024 11:48), Max: 36.8 (29 Jan 2024 23:27)  T(F): 97.3 (30 Jan 2024 11:48), Max: 98.2 (29 Jan 2024 23:27)  HR: 88 (30 Jan 2024 10:00) (72 - 110)  BP: 137/86 (30 Jan 2024 10:00) (89/59 - 169/84)  BP(mean): 93 (30 Jan 2024 10:00) (67 - 124)  RR: 15 (30 Jan 2024 10:00) (10 - 28)  SpO2: 97% (30 Jan 2024 10:00) (91% - 100%)    Parameters below as of 30 Jan 2024 04:00  Patient On (Oxygen Delivery Method): room air    PHYSICAL EXAM:  NOTE INCOMPLETE    LABS:                        10.0   5.79  )-----------( 177      ( 30 Jan 2024 02:54 )             31.3     01-30    133<L>  |  99  |  17.4  ----------------------------<  148<H>  4.3   |  24.0  |  0.87    Ca    8.6      30 Jan 2024 02:54  Phos  2.7     01-30  Mg     1.9     01-30    TPro  5.4<L>  /  Alb  3.3  /  TBili  0.5  /  DBili  x   /  AST  14  /  ALT  12  /  AlkPhos  88  01-29    PT/INR - ( 30 Jan 2024 02:54 )   PT: 17.6 sec;   INR: 1.61 ratio       PTT - ( 30 Jan 2024 02:54 )  PTT:34.7 sec  Urinalysis Basic - ( 30 Jan 2024 02:54 )    Color: x / Appearance: x / SG: x / pH: x  Gluc: 148 mg/dL / Ketone: x  / Bili: x / Urobili: x   Blood: x / Protein: x / Nitrite: x   Leuk Esterase: x / RBC: x / WBC x   Sq Epi: x / Non Sq Epi: x / Bacteria: x    01-29 @ 07:01  -  01-30 @ 07:00  --------------------------------------------------------  IN: 0 mL / OUT: 100 mL / NET: -100 mL    RADIOLOGY & ADDITIONAL TESTS:  CT Head No Cont (01.29.24 @ 17:40)  IMPRESSION:  Small right vertex extraaxial blood is stable. No new hemorrhage.  Left frontal extraaxial space more apparent, but lower density of   probable small developing hygroma.    CT Head/Cervical Spine No Cont (01.29.24 @ 11:44)  IMPRESSION:  CT BRAIN  1. Acute subdural hematoma at the right vertex, measuring 4-5 mm in   maximal thickness. No underlying mass effect orvasogenic edema. No acute   calvarial fracture visualized.  2. Additional stable findings, described in detail above.  CT OF SPINE  1. No significant change.  2. No evidence of acute fracture.  3. Straightening of lordosis can be seen in the setting of muscle spasm.  4. Additional findings, including those degenerative, described in detail   above. INTERVAL HPI/OVERNIGHT EVENTS:  88y Male PMH HTN, HLD, prostate CA, dementia, Afib on Eliquis, also on ASA 81, presented to [facility] 1/28/24 s/p mechanical fall, CTH obtained negative, discharged back to nursing home, became confused so was brought back to ED 1/29/24 and found with small right frontal SDH. Repeat 6 hour CTH yesterday 1/29 and 24 hour CTH today 1/30 stable.     Vital Signs Last 24 Hrs  T(C): 36.3 (30 Jan 2024 11:48), Max: 36.8 (29 Jan 2024 23:27)  T(F): 97.3 (30 Jan 2024 11:48), Max: 98.2 (29 Jan 2024 23:27)  HR: 88 (30 Jan 2024 10:00) (72 - 110)  BP: 137/86 (30 Jan 2024 10:00) (89/59 - 169/84)  BP(mean): 93 (30 Jan 2024 10:00) (67 - 124)  RR: 15 (30 Jan 2024 10:00) (10 - 28)  SpO2: 97% (30 Jan 2024 10:00) (91% - 100%)    Parameters below as of 30 Jan 2024 04:00  Patient On (Oxygen Delivery Method): room air    PHYSICAL EXAM:  GEN: NAD  MENTAL STATUS: Awakes to voice, sustains eye opening. Oriented to self, 2024. Thinks it's October, thinks he's in the [place] in [facility]. Minimally conversant but answers appropriately and following commands  CRANIAL NERVES: PERRL. Tracks examiner. Face grossly symmetric. Hearing intact  MOTOR: 5/5 b/l upper and lower extremities  SENSATION: Grossly intact to light touch throughout  EXTREMITIES: RLE hyperpigmentation    LABS:                        10.0   5.79  )-----------( 177      ( 30 Jan 2024 02:54 )             31.3     01-30    133<L>  |  99  |  17.4  ----------------------------<  148<H>  4.3   |  24.0  |  0.87    Ca    8.6      30 Jan 2024 02:54  Phos  2.7     01-30  Mg     1.9     01-30    TPro  5.4<L>  /  Alb  3.3  /  TBili  0.5  /  DBili  x   /  AST  14  /  ALT  12  /  AlkPhos  88  01-29    PT/INR - ( 30 Jan 2024 02:54 )   PT: 17.6 sec;   INR: 1.61 ratio       PTT - ( 30 Jan 2024 02:54 )  PTT:34.7 sec  Urinalysis Basic - ( 30 Jan 2024 02:54 )    Color: x / Appearance: x / SG: x / pH: x  Gluc: 148 mg/dL / Ketone: x  / Bili: x / Urobili: x   Blood: x / Protein: x / Nitrite: x   Leuk Esterase: x / RBC: x / WBC x   Sq Epi: x / Non Sq Epi: x / Bacteria: x    01-29 @ 07:01  -  01-30 @ 07:00  --------------------------------------------------------  IN: 0 mL / OUT: 100 mL / NET: -100 mL    RADIOLOGY & ADDITIONAL TESTS:  CT Head No Cont (01.29.24 @ 17:40)  IMPRESSION:  Small right vertex extraaxial blood is stable. No new hemorrhage.  Left frontal extraaxial space more apparent, but lower density of   probable small developing hygroma.    CT Head/Cervical Spine No Cont (01.29.24 @ 11:44)  IMPRESSION:  CT BRAIN  1. Acute subdural hematoma at the right vertex, measuring 4-5 mm in   maximal thickness. No underlying mass effect orvasogenic edema. No acute   calvarial fracture visualized.  2. Additional stable findings, described in detail above.  CT OF SPINE  1. No significant change.  2. No evidence of acute fracture.  3. Straightening of lordosis can be seen in the setting of muscle spasm.  4. Additional findings, including those degenerative, described in detail   above. INTERVAL HPI/OVERNIGHT EVENTS:  88y Male PMH HTN, HLD, prostate CA, dementia, Afib on Eliquis, also on ASA 81, presented to [facility] 1/28/24 s/p mechanical fall, CTH obtained negative, discharged back to nursing home, became confused so was brought back to ED 1/29/24 and found with small right frontal SDH. Repeat 6 hour CTH yesterday 1/29 and 24 hour CTH today 1/30 slightly worse. Repeat CTH pending 18:00    Vital Signs Last 24 Hrs  T(C): 36.3 (30 Jan 2024 11:48), Max: 36.8 (29 Jan 2024 23:27)  T(F): 97.3 (30 Jan 2024 11:48), Max: 98.2 (29 Jan 2024 23:27)  HR: 88 (30 Jan 2024 10:00) (72 - 110)  BP: 137/86 (30 Jan 2024 10:00) (89/59 - 169/84)  BP(mean): 93 (30 Jan 2024 10:00) (67 - 124)  RR: 15 (30 Jan 2024 10:00) (10 - 28)  SpO2: 97% (30 Jan 2024 10:00) (91% - 100%)    Parameters below as of 30 Jan 2024 04:00  Patient On (Oxygen Delivery Method): room air    PHYSICAL EXAM:  GEN: NAD  MENTAL STATUS: Awakes to voice, sustains eye opening. Oriented to self, 2024. Thinks it's October, thinks he's in the [place] in [facility]. Minimally conversant but answers appropriately and following commands  CRANIAL NERVES: PERRL. Tracks examiner. Face grossly symmetric. Hearing intact  MOTOR: 5/5 b/l upper and lower extremities  SENSATION: Grossly intact to light touch throughout  EXTREMITIES: RLE hyperpigmentation    LABS:                        10.0   5.79  )-----------( 177      ( 30 Jan 2024 02:54 )             31.3     01-30    133<L>  |  99  |  17.4  ----------------------------<  148<H>  4.3   |  24.0  |  0.87    Ca    8.6      30 Jan 2024 02:54  Phos  2.7     01-30  Mg     1.9     01-30    TPro  5.4<L>  /  Alb  3.3  /  TBili  0.5  /  DBili  x   /  AST  14  /  ALT  12  /  AlkPhos  88  01-29    PT/INR - ( 30 Jan 2024 02:54 )   PT: 17.6 sec;   INR: 1.61 ratio       PTT - ( 30 Jan 2024 02:54 )  PTT:34.7 sec  Urinalysis Basic - ( 30 Jan 2024 02:54 )    Color: x / Appearance: x / SG: x / pH: x  Gluc: 148 mg/dL / Ketone: x  / Bili: x / Urobili: x   Blood: x / Protein: x / Nitrite: x   Leuk Esterase: x / RBC: x / WBC x   Sq Epi: x / Non Sq Epi: x / Bacteria: x    01-29 @ 07:01  -  01-30 @ 07:00  --------------------------------------------------------  IN: 0 mL / OUT: 100 mL / NET: -100 mL    RADIOLOGY & ADDITIONAL TESTS:  CT Head No Cont (01.29.24 @ 17:40)  IMPRESSION:  Small right vertex extraaxial blood is stable. No new hemorrhage.  Left frontal extraaxial space more apparent, but lower density of   probable small developing hygroma.    CT Head/Cervical Spine No Cont (01.29.24 @ 11:44)  IMPRESSION:  CT BRAIN  1. Acute subdural hematoma at the right vertex, measuring 4-5 mm in   maximal thickness. No underlying mass effect orvasogenic edema. No acute   calvarial fracture visualized.  2. Additional stable findings, described in detail above.  CT OF SPINE  1. No significant change.  2. No evidence of acute fracture.  3. Straightening of lordosis can be seen in the setting of muscle spasm.  4. Additional findings, including those degenerative, described in detail   above. INTERVAL HPI/OVERNIGHT EVENTS:  88y Male PMH HTN, HLD, prostate CA, dementia, Afib on Eliquis, also on ASA 81, presented to [facility] 1/28/24 s/p mechanical fall, CTH obtained negative, discharged back to nursing home, became confused so was brought back to ED 1/29/24 and found with small right frontal SDH. Repeat 6 hour CTH yesterday 1/29 and 24 hour CTH today 1/30 slightly worse. Repeat CTH pending 18:00    Vital Signs Last 24 Hrs  T(C): 36.3 (30 Jan 2024 11:48), Max: 36.8 (29 Jan 2024 23:27)  T(F): 97.3 (30 Jan 2024 11:48), Max: 98.2 (29 Jan 2024 23:27)  HR: 88 (30 Jan 2024 10:00) (72 - 110)  BP: 137/86 (30 Jan 2024 10:00) (89/59 - 169/84)  BP(mean): 93 (30 Jan 2024 10:00) (67 - 124)  RR: 15 (30 Jan 2024 10:00) (10 - 28)  SpO2: 97% (30 Jan 2024 10:00) (91% - 100%)    Parameters below as of 30 Jan 2024 04:00  Patient On (Oxygen Delivery Method): room air    PHYSICAL EXAM:  GEN: NAD  MENTAL STATUS: Awakes to voice, sustains eye opening. Oriented to self, 2024. Thinks it's October, thinks he's in the [place] in [facility]. Minimally conversant but answers appropriately and following commands  CRANIAL NERVES: PERRL. Tracks examiner. Face grossly symmetric. Hearing intact  MOTOR: 5/5 b/l upper and lower extremities  SENSATION: Grossly intact to light touch throughout  EXTREMITIES: RLE hyperpigmentation    LABS:                        10.0   5.79  )-----------( 177      ( 30 Jan 2024 02:54 )             31.3     01-30    133<L>  |  99  |  17.4  ----------------------------<  148<H>  4.3   |  24.0  |  0.87    Ca    8.6      30 Jan 2024 02:54  Phos  2.7     01-30  Mg     1.9     01-30    TPro  5.4<L>  /  Alb  3.3  /  TBili  0.5  /  DBili  x   /  AST  14  /  ALT  12  /  AlkPhos  88  01-29    PT/INR - ( 30 Jan 2024 02:54 )   PT: 17.6 sec;   INR: 1.61 ratio       PTT - ( 30 Jan 2024 02:54 )  PTT:34.7 sec  Urinalysis Basic - ( 30 Jan 2024 02:54 )    Color: x / Appearance: x / SG: x / pH: x  Gluc: 148 mg/dL / Ketone: x  / Bili: x / Urobili: x   Blood: x / Protein: x / Nitrite: x   Leuk Esterase: x / RBC: x / WBC x   Sq Epi: x / Non Sq Epi: x / Bacteria: x    01-29 @ 07:01  -  01-30 @ 07:00  --------------------------------------------------------  IN: 0 mL / OUT: 100 mL / NET: -100 mL    RADIOLOGY & ADDITIONAL TESTS:  CT Head No Cont (01.30.24 @ 12:09)  IMPRESSION:  Slightly increased size of thin high right frontoparietal subdural   hemorrhage, currently measuring 4 mm in greatest depth, previously   measuring 3 mm.  Similar bilateral frontoparietal low-density collections measuring up to   1.0 cm on the left and 0.8 cm on the right.  No parenchymal hemorrhage orbrain edema.  Similar mild rightward midline shift. No effacement of basal cisterns. No   hydrocephalus.    CT Head No Cont (01.29.24 @ 17:40)  IMPRESSION:  Small right vertex extraaxial blood is stable. No new hemorrhage.  Left frontal extraaxial space more apparent, but lower density of   probable small developing hygroma.    CT Head/Cervical Spine No Cont (01.29.24 @ 11:44)  IMPRESSION:  CT BRAIN  1. Acute subdural hematoma at the right vertex, measuring 4-5 mm in   maximal thickness. No underlying mass effect orvasogenic edema. No acute   calvarial fracture visualized.  2. Additional stable findings, described in detail above.  CT OF SPINE  1. No significant change.  2. No evidence of acute fracture.  3. Straightening of lordosis can be seen in the setting of muscle spasm.  4. Additional findings, including those degenerative, described in detail   above.

## 2024-01-30 NOTE — CONSULT NOTE ADULT - SUBJECTIVE AND OBJECTIVE BOX
[facility]                                              CARDIOLOGY AT [facility]                                                   [address], [place] [address]                                             Telephone: [phone]. Fax:[phone]                                                       CARDIOLOGY CONSULTATION NOTE                                                                                             History obtained by: Patient and medical record  Community Cardiologist: doesn't have one. Has doctors in assisted living managing his care and medications    obtained: Yes [  ] No [ x ]  Reason for Consultation: heart failure and anticoagulation   Available out pt records reviewed: Yes [  ] No [  x]    Chief complaint:    Patient is a 88y old  Male who presents with a chief complaint of Fall, SDH (30 Jan 2024 12:49)      HPI: obtained from previous note and daughter Lis.   Patient is an 87 y/o male with a PMH of HTN, HLD, prostate cancer, dementia and afib on eliquis presenting after a mechanical fall this morning. Per EMS the patient was walking in the hallway when he fell backwards and struck his head against the wall/the floor. He was noted to possibly have some confusion as compared to his baseline per his nursing facility. Upon arrival to the ED, he has no complaints and denies headache or pain anywhere, He has a small occipital head lac that is not actively bleeding.   Patient seen at the bedside and evaluated. After speaking more with the facility the patient is mentating at his baseline and his last doses of aspirin and Eliquis were the morning of arrival (1/29/24). CTH found to have a 4-5 mm SDH.      CARDIAC TESTING   ECHO: < from: TTE W or WO Ultrasound Enhancing Agent (01.29.24 @ 21:34) >  1. Left ventricular systolic function is normal with an ejection fraction visually estimated at 70 to 75 %.   2. Elevated filling pressure.   3. The right ventricle is not well visualized. Based on visual assessment, the right ventricle appears mildly enlarged. probably normal systolic function.   4. The right atrium is severely dilated in size.   5. Mild tricuspid regurgitation.   6. Estimated pulmonary artery systolic pressure is 68 mmHg, consistent with elevated pulmonary artery pressure.   7. Aortic root at the sinuses ofValsalva is dilated, measuring 3.90 cm (indexed 1.93 cm/m²).   8. No prior echocardiogram is available for comparison.      < end of copied text >      STRESS: none     CATH:  angioplasty 20 years ago.     ELECTROPHYSIOLOGY: none     PAST MEDICAL HISTORY      PAST SURGICAL HISTORY      SOCIAL HISTORY:  Denies smoking/alcohol/drugs per daughter.    CIGARETTES:     ALCOHOL:  DRUGS:    FAMILY HISTORY: obtained from daughter     Family History of Cardiovascular Disease:  Yes [  ] No [ x ]  Coronary Artery Disease in first degree relative: Yes [  ] No [ x ]  Sudden Cardiac Death in First degree relative: Yes [  ] No [ x ]    HOME MEDICATIONS:  acetaminophen 325 mg oral tablet: 2 tab(s) orally every 6 hours as needed for  mild pain (29 Jan 2024 16:55)  aspirin 81 mg oral tablet, chewable: 1 tab(s) chewed once a day (29 Jan 2024 16:55)  atenolol 25 mg oral tablet: 1 tab(s) orally once a day (29 Jan 2024 16:55)  atorvastatin 40 mg oral tablet: 1 tab(s) orally once a day (at bedtime) (29 Jan 2024 16:55)  donepezil 5 mg oral tablet: 1 tab(s) orally once a day At dinner (29 Jan 2024 16:55)  Eliquis 5 mg oral tablet: 1 tab(s) orally 2 times a day (29 Jan 2024 16:55)  furosemide 20 mg oral tablet: 1 tab(s) orally once a day (29 Jan 2024 16:55)  magnesium oxide 400 mg oral tablet: 1 tab(s) orally once a day (29 Jan 2024 16:53)  melatonin 3 mg oral tablet: 1 tab(s) orally once a day (at bedtime) (in combination with 5 mG tablet for total 7 mG per dose) (29 Jan 2024 16:55)  memantine 5 mg oral tablet: 1 tab(s) orally 2 times a day (29 Jan 2024 16:55)  Potassium Chloride (Eqv-Klor-Con 10) 10 mEq oral tablet, extended release: 1 tab(s) orally once a day (29 Jan 2024 16:55)  sertraline 50 mg oral tablet: 1 tab(s) orally once a day (29 Jan 2024 16:55)      CURRENT CARDIAC MEDICATIONS:  furosemide    Tablet 20 milliGRAM(s) Oral daily  metoprolol tartrate 25 milliGRAM(s) Oral two times a day      CURRENT OTHER MEDICATIONS:  donepezil 5 milliGRAM(s) Oral at bedtime  levETIRAcetam 250 milliGRAM(s) Oral two times a day  melatonin 3 milliGRAM(s) Oral at bedtime  memantine 5 milliGRAM(s) Oral daily  QUEtiapine 12.5 milliGRAM(s) Oral at bedtime  sertraline 50 milliGRAM(s) Oral daily  atorvastatin 40 milliGRAM(s) Oral at bedtime  ceFAZolin  Injectable. 2000 milliGRAM(s) IV Push every 8 hours, Stop order after: 5 Days  diphtheria/tetanus Vaccine - Adult 0.5 milliLiter(s) IntraMuscular once, Stop order after: 1 Doses  enoxaparin Injectable 30 milliGRAM(s) SubCutaneous every 12 hours  potassium chloride    Tablet ER 10 milliEquivalent(s) Oral daily      ALLERGIES:   No Known Allergies      REVIEW OF SYMPTOMS: unable to obtain as pt is pleasantly confused     VITAL SIGNS:  T(C): 36.3 (01-30-24 @ 11:48), Max: 36.8 (01-29-24 @ 23:27)  T(F): 97.3 (01-30-24 @ 11:48), Max: 98.2 (01-29-24 @ 23:27)  HR: 86 (01-30-24 @ 13:00) (72 - 110)  BP: 131/63 (01-30-24 @ 13:00) (89/59 - 169/84)  RR: 14 (01-30-24 @ 13:00) (10 - 28)  SpO2: 98% (01-30-24 @ 11:48) (91% - 100%)    INTAKE AND OUTPUT:     01-29 @ 07:01  -  01-30 @ 07:00  --------------------------------------------------------  IN: 0 mL / OUT: 100 mL / NET: -100 mL        PHYSICAL EXAM:  Constitutional: Comfortable . No acute distress.   HEENT: Atraumatic and normocephalic , neck is supple . no JVD. No carotid bruit.  CNS: A&Ox1, confused. No focal deficits.   Respiratory: CTAB, unlabored   Cardiovascular: Irregularly irregular. No murmur. No rubs or gallop.  Gastrointestinal: Soft, non-tender. +Bowel sounds.   Extremities: 2+ Peripheral Pulses, No clubbing and cyanosis. B/L LE +3 pitting edema  Psychiatric: Calm . no agitation.   Skin: Warm and dry, no ulcers on extremities.      LABS:  ( 30 Jan 2024 02:54 )  Troponin T  X    ,  CPK  62   , CKMB  X    , BNP X                                  10.0   5.79  )-----------( 177      ( 30 Jan 2024 02:54 )             31.3     01-30    133<L>  |  99  |  17.4  ----------------------------<  148<H>  4.3   |  24.0  |  0.87    Ca    8.6      30 Jan 2024 02:54  Phos  2.7     01-30  Mg     1.9     01-30    TPro  5.4<L>  /  Alb  3.3  /  TBili  0.5  /  DBili  x   /  AST  14  /  ALT  12  /  AlkPhos  88  01-29    PT/INR - ( 30 Jan 2024 02:54 )   PT: 17.6 sec;   INR: 1.61 ratio         PTT - ( 30 Jan 2024 02:54 )  PTT:34.7 sec  Urinalysis Basic - ( 30 Jan 2024 02:54 )    Color: x / Appearance: x / SG: x / pH: x  Gluc: 148 mg/dL / Ketone: x  / Bili: x / Urobili: x   Blood: x / Protein: x / Nitrite: x   Leuk Esterase: x / RBC: x / WBC x   Sq Epi: x / Non Sq Epi: x / Bacteria: x          Thyroid Stimulating Hormone, Serum: 1.19 uIU/mL (01-30-24 @ 02:54)      INTERPRETATION OF TELEMETRY: Afib     ECG: Afib   Prior ECG: Yes [ x ] No [  ]    RADIOLOGY & ADDITIONAL STUDIES:    X-ray:    CT scan: < from: CT Head No Cont (01.29.24 @ 17:40) >  Small right vertex extraaxial blood is stable. No new hemorrhage.    Left frontal extraaxial space more apparent, but lower density of   probable small developing hygroma.    < end of copied text >    MRI:   US:< from: US Duplex Venous Lower Ext Complete, Bilateral (01.29.24 @ 23:20) >  No evidence of deep venous thrombosis in either lower extremity.        < end of copied text >

## 2024-01-30 NOTE — PATIENT PROFILE ADULT - FALL HARM RISK - HARM RISK INTERVENTIONS
Assistance OOB with selected safe patient handling equipment/Communicate Risk of Fall with Harm to all staff/Monitor for mental status changes/Move patient closer to nurses' station/Reinforce activity limits and safety measures with patient and family/Reorient to person, place and time as needed/Tailored Fall Risk Interventions/Toileting schedule using arm’s reach rule for commode and bathroom/Use of alarms - bed, chair and/or voice tab/Visual Cue: Yellow wristband and red socks/Bed in lowest position, wheels locked, appropriate side rails in place/Call bell, personal items and telephone in reach/Instruct patient to call for assistance before getting out of bed or chair/Non-slip footwear when patient is out of bed/[place] to call system/Physically safe environment - no spills, clutter or unnecessary equipment/Purposeful Proactive Rounding/Room/bathroom lighting operational, light cord in reach

## 2024-01-30 NOTE — PROGRESS NOTE ADULT - ASSESSMENT
88y Male PMH HTN, HLD, prostate CA, dementia, Afib on Eliquis, also on ASA 81, presented to [facility] 1/28/24 s/p mechanical fall, CTH obtained negative, discharged back to nursing home, became confused so was brought back to ED 1/29/24 and found with small right frontal SDH.  - Repeat 6 hour CTH yesterday 1/29 with stable right SDH, probable small left developing hygroma  - 24 hour CTH today 1/30 stable    PLAN:  - NOTE INCOMPLETE 88y Male PMH HTN, HLD, prostate CA, dementia, Afib on Eliquis, also on ASA 81, presented to [facility] 1/28/24 s/p mechanical fall, CTH obtained negative, discharged back to nursing home, became confused so was brought back to ED 1/29/24 and found with small right frontal SDH.  - Repeat 6 hour CTH yesterday 1/29 with stable right SDH, probable small left developing hygroma  - 24 hour CTH today 1/30 slightly larger SDH    PLAN:  - D/w Dr. Zaragoza  - Continue Q1 neuro checks  - Repeat CTH 6 PM  - Continue Keppra 250 BID x 7 days  - Hold ACT/APT for now  - Further recs if any pending repeat CTH 6 PM  -  88y Male PMH HTN, HLD, prostate CA, dementia, Afib on Eliquis, also on ASA 81, presented to [facility] 1/28/24 s/p mechanical fall, CTH obtained negative, discharged back to nursing home, became confused so was brought back to ED 1/29/24 and found with small right frontal SDH.  - Repeat 6 hour CTH yesterday 1/29 with stable right SDH, probable small left developing hygroma  - 24 hour CTH today 1/30 slightly larger SDH    PLAN:  - D/w Dr. Zaragoza  - Continue Q1 neuro checks  - Repeat CTH 6 PM  - Continue Keppra 250 BID x 7 days  - Hold ACT/APT for now  - Further recs if any pending repeat CTH 6 PM

## 2024-01-30 NOTE — CONSULT NOTE ADULT - ASSESSMENT
87 y/o M with hx of HTN, HLD, prostate cancer, dementia, and AFib on Eliquis presenting after a mechanical fall in the nursing facility. Per EMS, pt was walking in the hallway where he fell backwards and struck his head against the wall/floor. He was noted to have some confusion in comparison to his baseline per nursing facility. Patient is found to have a small R frontal SDH.

## 2024-01-30 NOTE — PHYSICAL THERAPY INITIAL EVALUATION ADULT - PERTINENT HX OF CURRENT PROBLEM, REHAB EVAL
87 y/o male with a PMH of HTN, HLD, prostate cancer, dementia and afib on eliquis presenting s/p mechanical fall. CTH shows right vertex SDH. 6 hour CTH stable.

## 2024-01-30 NOTE — PROGRESS NOTE ADULT - SUBJECTIVE AND OBJECTIVE BOX
HPI:  Patient is an 89 y/o male with a PMH of HTN, HLD, prostate cancer, dementia and afib on eliquis presenting after a mechanical fall this morning. Per EMS the patient was walking in the hallway when he fell backwards and struck his head against the wall/the floor. He was noted to possibly have some confusion as compared to his baseline per his nursing facility. Upon arrival he has no complaints and denies headache or pain anywhere, He has a small occipital head lac that is not actively bleeding.    (29 Jan 2024 13:01)      INTERVAL EVENTS:   6 hour CTH stable, patient started on a regular diet. LE duplex performed, found to be negative. Patient agitated and restless overnight, given 25mg Seroquel PO.       Subjective:   Patient denies any acute complaints.    PHYSICAL EXAM:    Constitutional: NAD, well-groomed, well-developed  HEENT: PERRL, EOMI, no drainage or redness  Neck: No JVD  Respiratory: Breath Sounds equal & clear to auscultation, no accessory muscle use  Cardiovascular: Irregular rate; normal S1, S2; no murmurs, gallops or rubs   Gastrointestinal: Soft, non-tender, no hepatosplenomegaly, normal bowel sounds  Extremities: 1+ pitting edema to b/l LE; anterior aspect of RLE with poorly demarcated erythema and warmth; No cyanosis, clubbing   Vascular: Equal and normal pulses: 2+ peripheral pulses throughout  Neurological: GCS:14 (E3/V4/M6). A&O x 2; no sensory, motor or coordination deficits, normal reflexes  Psychiatric: Normal mood, normal affect  Musculoskeletal: No joint pain, swelling or deformity; no limitation of movement  Skin: Anterior aspect of RLE with area of poorly demarcated erythema and warmth      MEDICATIONS  (STANDING):  atorvastatin 40 milliGRAM(s) Oral at bedtime  ceFAZolin  Injectable. 2000 milliGRAM(s) IV Push every 8 hours  chlorhexidine 2% Cloths 1 Application(s) Topical daily  diphtheria/tetanus Vaccine - Adult 0.5 milliLiter(s) IntraMuscular once  donepezil 5 milliGRAM(s) Oral at bedtime  furosemide    Tablet 20 milliGRAM(s) Oral daily  levETIRAcetam 250 milliGRAM(s) Oral two times a day  magnesium sulfate  IVPB 2 Gram(s) IV Intermittent once  melatonin 3 milliGRAM(s) Oral at bedtime  memantine 5 milliGRAM(s) Oral daily  metoprolol tartrate 25 milliGRAM(s) Oral two times a day  potassium chloride    Tablet ER 10 milliEquivalent(s) Oral daily  potassium phosphate / sodium phosphate Powder (PHOS-NaK) 1 Packet(s) Oral once  sertraline 50 milliGRAM(s) Oral daily    MEDICATIONS  (PRN):            ICU Vital Signs Last 24 Hrs  T(C): 36.8 (29 Jan 2024 23:27), Max: 36.8 (29 Jan 2024 23:27)  T(F): 98.2 (29 Jan 2024 23:27), Max: 98.2 (29 Jan 2024 23:27)  HR: 82 (30 Jan 2024 04:00) (77 - 110)  BP: 117/95 (30 Jan 2024 04:00) (117/95 - 169/84)  BP(mean): 100 (30 Jan 2024 04:00) (83 - 124)  ABP: --  ABP(mean): --  RR: 14 (30 Jan 2024 04:00) (10 - 28)  SpO2: 93% (30 Jan 2024 04:00) (91% - 100%)    O2 Parameters below as of 30 Jan 2024 04:00  Patient On (Oxygen Delivery Method): room air                I&O's Detail    29 Jan 2024 07:01  -  30 Jan 2024 05:23  --------------------------------------------------------  IN:  Total IN: 0 mL    OUT:    Voided (mL): 100 mL  Total OUT: 100 mL    Total NET: -100 mL          LABS:  CBC Full  -  ( 30 Jan 2024 02:54 )  WBC Count : 5.79 K/uL  RBC Count : 3.55 M/uL  Hemoglobin : 10.0 g/dL  Hematocrit : 31.3 %  Platelet Count - Automated : 177 K/uL  Mean Cell Volume : 88.2 fl  Mean Cell Hemoglobin : 28.2 pg  Mean Cell Hemoglobin Concentration : 31.9 gm/dL  Auto Neutrophil # : 4.44 K/uL  Auto Lymphocyte # : 0.68 K/uL  Auto Monocyte # : 0.55 K/uL  Auto Eosinophil # : 0.07 K/uL  Auto Basophil # : 0.03 K/uL  Auto Neutrophil % : 76.8 %  Auto Lymphocyte % : 11.7 %  Auto Monocyte % : 9.5 %  Auto Eosinophil % : 1.2 %  Auto Basophil % : 0.5 %    01-30    133<L>  |  99  |  17.4  ----------------------------<  148<H>  4.3   |  24.0  |  0.87    Ca    8.6      30 Jan 2024 02:54  Phos  2.7     01-30  Mg     1.9     01-30    TPro  5.4<L>  /  Alb  3.3  /  TBili  0.5  /  DBili  x   /  AST  14  /  ALT  12  /  AlkPhos  88  01-29    PT/INR - ( 30 Jan 2024 02:54 )   PT: 17.6 sec;   INR: 1.61 ratio         PTT - ( 30 Jan 2024 02:54 )  PTT:34.7 sec  Urinalysis Basic - ( 30 Jan 2024 02:54 )    Color: x / Appearance: x / SG: x / pH: x  Gluc: 148 mg/dL / Ketone: x  / Bili: x / Urobili: x   Blood: x / Protein: x / Nitrite: x   Leuk Esterase: x / RBC: x / WBC x   Sq Epi: x / Non Sq Epi: x / Bacteria: x

## 2024-01-30 NOTE — PROGRESS NOTE ADULT - SUBJECTIVE AND OBJECTIVE BOX
ELSI CASTAÑEDA    [id number]    88y      Male    Patient is a 88y old  Male who presents with a chief complaint of Fall, SDH (30 Jan 2024 05:22)      INTERVAL HPI/OVERNIGHT EVENTS:    Patient is pleasantly confused, no overnight issues, no fever, Na came down to 133       Vital Signs Last 24 Hrs  T(C): 36.2 (30 Jan 2024 07:23), Max: 36.8 (29 Jan 2024 23:27)  T(F): 97.2 (30 Jan 2024 07:23), Max: 98.2 (29 Jan 2024 23:27)  HR: 88 (30 Jan 2024 10:00) (72 - 110)  BP: 137/86 (30 Jan 2024 10:00) (89/59 - 169/84)  BP(mean): 93 (30 Jan 2024 10:00) (67 - 124)  RR: 15 (30 Jan 2024 10:00) (10 - 28)  SpO2: 97% (30 Jan 2024 10:00) (91% - 100%)    Parameters below as of 30 Jan 2024 04:00  Patient On (Oxygen Delivery Method): room air        PHYSICAL EXAM:    GENERAL: Elderly male looking pleasantly confused   HEENT: has scalp laceration at occipital region   NECK: supple   CHEST/LUNG: Clear to auscultate bilaterally; No wheezing  HEART: S1S2+, Regular rate and rhythm; No murmurs  ABDOMEN: Soft, Nontender, Nondistended; Bowel sounds present  EXTREMITIES:  1+ Peripheral Pulses, No edema  SKIN: No rashes or lesions  NEURO: pleasantly confused, following commends         LABS:                        10.0   5.79  )-----------( 177      ( 30 Jan 2024 02:54 )             31.3     01-30    133<L>  |  99  |  17.4  ----------------------------<  148<H>  4.3   |  24.0  |  0.87    Ca    8.6      30 Jan 2024 02:54  Phos  2.7     01-30  Mg     1.9     01-30    TPro  5.4<L>  /  Alb  3.3  /  TBili  0.5  /  DBili  x   /  AST  14  /  ALT  12  /  AlkPhos  88  01-29    PT/INR - ( 30 Jan 2024 02:54 )   PT: 17.6 sec;   INR: 1.61 ratio         PTT - ( 30 Jan 2024 02:54 )  PTT:34.7 sec        I&O's Summary    29 Jan 2024 07:01  -  30 Jan 2024 07:00  --------------------------------------------------------  IN: 0 mL / OUT: 100 mL / NET: -100 mL        MEDICATIONS  (STANDING):  atorvastatin 40 milliGRAM(s) Oral at bedtime  ceFAZolin  Injectable. 2000 milliGRAM(s) IV Push every 8 hours  chlorhexidine 2% Cloths 1 Application(s) Topical daily  diphtheria/tetanus Vaccine - Adult 0.5 milliLiter(s) IntraMuscular once  donepezil 5 milliGRAM(s) Oral at bedtime  furosemide    Tablet 20 milliGRAM(s) Oral daily  levETIRAcetam 250 milliGRAM(s) Oral two times a day  melatonin 3 milliGRAM(s) Oral at bedtime  memantine 5 milliGRAM(s) Oral daily  metoprolol tartrate 25 milliGRAM(s) Oral two times a day  potassium chloride    Tablet ER 10 milliEquivalent(s) Oral daily  sertraline 50 milliGRAM(s) Oral daily    MEDICATIONS  (PRN):

## 2024-01-30 NOTE — PROGRESS NOTE ADULT - ASSESSMENT
ASSESSMENT:  Patient is an 89 y/o male with a PMH of HTN, HLD, prostate cancer, dementia, and afib (on eliquis), presented after fall on 1/29, found to have 4-5mm SDH.     PLAN:    NEURO:   #Right vertex SDH  #H/o dementia  -Currently, GCS:14 (E3/V4/M6), A&O x 2  - 6 hour CTH stable, will obtain 24 hour scan at noon, or sooner if change in mental status  - Continue Keppra for seizure prophylaxis x 7 days (through 2 / 5)   -Q1hr neuro checks x 24 hours   - Continue home Donepezil, Namenda, and Sertraline  - Tertiary exam in [facility] trauma consulted 1/29    CV:   #H/o Afib   #Heart Failure?   -CXR with evidence of pulmonary congestion, b/l LE edema, c/w congestive heart failure, no prior echo available for comparison; will f/u TTE   -On Atenolol 25 QD at home for rate control; started Metoprolol 12.5 BID   - Continue home lipitor, lasix and potassium supplementation   -MARCIAL-VASc score: 4 points; will hold home Eliquis for now iso SDH     PULM:   #B/l Pleural Effusions  -CT chest with b/l pleural effusions, R>L, currently satting well on RA  - CTM respiratory status, goal SpO2> 92%  - Incentive spirometer for compressive atelectasis      GI:   -Regular diet  -Monitor for bowel movement    RENAL:   - Voiding spontaneously   - Monitor UOP and CMP  - Replete electrolytes prn, goal Mag > 2, Phos > 3, K >4    HEME:  - LE duplex obtained to r/o DVT iso RLE redness and swelling, preliminary report is negative  - Will start chemoprophylaxis 24 hours after stable CTH (1/30 at 5PM)    ID:   #RLE cellulitis  -Continue Cefazolin x 5 days for cellulitis  -Monitor WBC and fever curve     ENDO:   -No active issues, goal glucose <180    SKIN:   -Lines: PIVs  -2cm scalp laceration, stapled on 1/29  -Antibiotics for RLE cellulitis as above    DISPO: SICU

## 2024-01-30 NOTE — PROGRESS NOTE ADULT - CRITICAL CARE ATTENDING COMMENT
x Reportedly agitated overnight.    GEN:      Fall  Traumatic SDH  R leg cellulitis    -Plan for repeat head scan at 12pm (most recent earlier scan was stable)  -Breathing comfortably on RA  -Afebrile, WBC normal, on cefazolin for x 5 days for RLE cellulits  -SCDs, likely will start lovenox if 12pm scan is stable, duplex negative of lower extremities  -Cr stable, voiding Reportedly agitated overnight.    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  ABD:  Soft, non tender, non distended  EXT:  Warm, edema at ankles up to upper calves, R leg w/ovoid area (anterior) of erythema and warmth -appears to be waning in nature    Fall  Traumatic SDH  R leg cellulitis    -Plan for repeat head scan at 12pm (most recent earlier scan was stable), was given seroquel overnight -but would decrease dose in setting of appropriately elevated Qtc as well as his somnolence  -Breathing comfortably on RA, despite effusions noted on index scan  -Afebrile, WBC normal, on cefazolin for x 5 days for RLE cellulitis -reportedly now improved  -H/H stable  -SCDs, likely will start lovenox if 12pm scan is stable, duplex negative of lower extremities  -Cr stable, voiding  -PIV  -PT eval  -If scan remains stable, discuss w/NSGY consideration for transfer from critical care setting. Reportedly w/some agitation overnight.    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  ABD:  Soft, non tender, non distended  EXT:  Warm, edema at ankles up to upper calves, R leg w/ovoid area (anterior) of erythema and warmth -appears to be waning in nature    Fall  Traumatic SDH  R leg cellulitis    -Plan for repeat head scan at 12pm (most recent earlier scan was stable), was given seroquel overnight -but would decrease dose in setting of appropriately elevated Qtc as well as his somnolence  -Patient noted to have several recent frequent falls.  Recognizing that the patient has an elevated CHADVASC score - would follow up w/patient's Cardiology group to confirm that they would still recommend full dose AC in setting of dementia w/multiple falls and now a traumatic SDH  -Breathing comfortably on RA, despite effusions noted on index scan  -Afebrile, WBC normal, on cefazolin for x 5 days for RLE cellulitis -reportedly now improved  -H/H stable  -SCDs, likely will start lovenox if 12pm scan is stable, duplex negative of lower extremities  -Cr stable, voiding  -PIV  -PT eval  -Patient is a DNR/DNI but would accept trial of non invasive ventilation  -Tertiary evaluation completed  -Geriatric evaluation appreciated  -If scan remains stable, discuss w/NSGY consideration for transfer from critical care setting later today - especially in setting of trying to prevent delirium by keeping an elderly patient in critical care setting too long

## 2024-01-30 NOTE — PROGRESS NOTE ADULT - ASSESSMENT
87 y/o male with Hx of HTN, HLD, prostate cancer, dementia and afib on eliquis presenting after a mechanical fall this morning. Per EMS the patient was walking in the hallway when he fell backwards and struck his head against the wall/the floor. He was noted to possibly have some confusion as compared to his baseline per his nursing facility, He has a small occipital head lac that is not actively bleeding, imaging in ED done showed Acute subdural hematoma at the right vertex, measuring 4-5 mm in maximal thickness. No underlying mass effect or vasogenic edema. No acute calvarial fracture visualized, admitted under Trauma Service and was seen by Neurosurgery team, medicine consult for [facility] Trauma eval     Plan:     Identification of Seniors at Risk:                                                                                                                                       Before the event that brought you to the hospital, did you need someone to help you on a regular basis?    unable to answer   In the 24 hours before your injury, have you needed more help than usual?                                                 Unable to anser          Have you been hospitalized for one or more nights during the past six months?                                         Unable to answer   In general, do you have serious problems with your vision that cannot be corrected with glasses?                Unable to anser   In general, do you have serious problems with your memory?                                                                    yes he has dementia and he is pleasantly confused               Do you take six or more different medications every day?                                                                          yes                    A positive screen is an answer of yes to 2 or more - Total:     his screening is positive    would recommend   -r/o syncope   -orthostatic vitals   -cardiac monitoring   -cardiology consult  -cycle trops negative  -PT and OT eval   -TSH and vitamin b12 ok  -TTE done showed Left ventricular systolic function is normal with an ejection fraction visually estimated at 70 to 75 %, Estimated pulmonary artery systolic pressure is 68 mmHg, consistent with elevated pulmonary artery pressure.    Fall/  Acute subdural hematoma at the right vertex  -neurochecks   -Neurosurgery eval   -SCDs for DVT ppx  -Pain control  -PT evaluation   -speech therapy eval   -pain meds and DVT prophylaxis    -wound care as has laceration on occipital region  -Tetanus shot  -serial imaging   -pain meds   -DVT ppx  as per Primary team   -Holding asa and Eliquis  -if worsening neurological status or repeat imaging shows worsening bleeding might need single donor platelets and mixed cryo  on Keppra for seizure prophylaxis   Fall risk and precautions.     Hyponatremia: will repeat Sodium level, if trending down, might needed to put back on his Lasix and do fluids restriction     Hx of Dementia: would continue with Donepezil and Memantine.     Hx of Insomnia: Will continue with Melatonin    Hx of Anxiety: will continue with Sertraline 50mg daily.     Hx of HTN and Afib: will continue with Atenolol 50 mg daily with holding parameters, Eliquis is on hold    Hx of HLD: will continue with Atorvastatin 40mg daily.     Hx of Peripheral edema/? hx of CHF: on lasix, will hold it for now, he has edema     Hx of Prostate Ca: Will monitor for rentention.    Plan of care discuss with Trauma team.

## 2024-01-31 DIAGNOSIS — I50.33 ACUTE ON CHRONIC DIASTOLIC (CONGESTIVE) HEART FAILURE: ICD-10-CM

## 2024-01-31 LAB
ANION GAP SERPL CALC-SCNC: 10 MMOL/L — SIGNIFICANT CHANGE UP (ref 5–17)
BASOPHILS # BLD AUTO: 0.05 K/UL — SIGNIFICANT CHANGE UP (ref 0–0.2)
BASOPHILS NFR BLD AUTO: 0.7 % — SIGNIFICANT CHANGE UP (ref 0–2)
BUN SERPL-MCNC: 20.1 MG/DL — HIGH (ref 8–20)
BUN SERPL-MCNC: 20.5 MG/DL — HIGH (ref 8–20)
BUN SERPL-MCNC: 20.6 MG/DL — HIGH (ref 8–20)
CALCIUM SERPL-MCNC: 8.1 MG/DL — LOW (ref 8.4–10.5)
CALCIUM SERPL-MCNC: 8.3 MG/DL — LOW (ref 8.4–10.5)
CALCIUM SERPL-MCNC: 8.4 MG/DL — SIGNIFICANT CHANGE UP (ref 8.4–10.5)
CHLORIDE SERPL-SCNC: 100 MMOL/L — SIGNIFICANT CHANGE UP (ref 96–108)
CHLORIDE SERPL-SCNC: 100 MMOL/L — SIGNIFICANT CHANGE UP (ref 96–108)
CHLORIDE SERPL-SCNC: 101 MMOL/L — SIGNIFICANT CHANGE UP (ref 96–108)
CO2 SERPL-SCNC: 23 MMOL/L — SIGNIFICANT CHANGE UP (ref 22–29)
CO2 SERPL-SCNC: 24 MMOL/L — SIGNIFICANT CHANGE UP (ref 22–29)
CO2 SERPL-SCNC: 26 MMOL/L — SIGNIFICANT CHANGE UP (ref 22–29)
CREAT SERPL-MCNC: 0.99 MG/DL — SIGNIFICANT CHANGE UP (ref 0.5–1.3)
CREAT SERPL-MCNC: 1.02 MG/DL — SIGNIFICANT CHANGE UP (ref 0.5–1.3)
CREAT SERPL-MCNC: 1.02 MG/DL — SIGNIFICANT CHANGE UP (ref 0.5–1.3)
EGFR: 71 ML/MIN/1.73M2 — SIGNIFICANT CHANGE UP
EGFR: 71 ML/MIN/1.73M2 — SIGNIFICANT CHANGE UP
EGFR: 74 ML/MIN/1.73M2 — SIGNIFICANT CHANGE UP
EOSINOPHIL # BLD AUTO: 0.2 K/UL — SIGNIFICANT CHANGE UP (ref 0–0.5)
EOSINOPHIL NFR BLD AUTO: 2.8 % — SIGNIFICANT CHANGE UP (ref 0–6)
GLUCOSE SERPL-MCNC: 128 MG/DL — HIGH (ref 70–99)
GLUCOSE SERPL-MCNC: 132 MG/DL — HIGH (ref 70–99)
GLUCOSE SERPL-MCNC: 136 MG/DL — HIGH (ref 70–99)
HCT VFR BLD CALC: 31.7 % — LOW (ref 39–50)
HGB BLD-MCNC: 10.6 G/DL — LOW (ref 13–17)
IMM GRANULOCYTES NFR BLD AUTO: 0.4 % — SIGNIFICANT CHANGE UP (ref 0–0.9)
LYMPHOCYTES # BLD AUTO: 1.15 K/UL — SIGNIFICANT CHANGE UP (ref 1–3.3)
LYMPHOCYTES # BLD AUTO: 16.2 % — SIGNIFICANT CHANGE UP (ref 13–44)
MAGNESIUM SERPL-MCNC: 1.8 MG/DL — SIGNIFICANT CHANGE UP (ref 1.6–2.6)
MAGNESIUM SERPL-MCNC: 2.2 MG/DL — SIGNIFICANT CHANGE UP (ref 1.6–2.6)
MCHC RBC-ENTMCNC: 29.4 PG — SIGNIFICANT CHANGE UP (ref 27–34)
MCHC RBC-ENTMCNC: 33.4 GM/DL — SIGNIFICANT CHANGE UP (ref 32–36)
MCV RBC AUTO: 88.1 FL — SIGNIFICANT CHANGE UP (ref 80–100)
MONOCYTES # BLD AUTO: 0.91 K/UL — HIGH (ref 0–0.9)
MONOCYTES NFR BLD AUTO: 12.8 % — SIGNIFICANT CHANGE UP (ref 2–14)
NEUTROPHILS # BLD AUTO: 4.75 K/UL — SIGNIFICANT CHANGE UP (ref 1.8–7.4)
NEUTROPHILS NFR BLD AUTO: 67.1 % — SIGNIFICANT CHANGE UP (ref 43–77)
NT-PROBNP SERPL-SCNC: 3158 PG/ML — HIGH (ref 0–300)
PHOSPHATE SERPL-MCNC: 2.4 MG/DL — SIGNIFICANT CHANGE UP (ref 2.4–4.7)
PHOSPHATE SERPL-MCNC: 3.1 MG/DL — SIGNIFICANT CHANGE UP (ref 2.4–4.7)
PLATELET # BLD AUTO: 180 K/UL — SIGNIFICANT CHANGE UP (ref 150–400)
POTASSIUM SERPL-MCNC: 4.1 MMOL/L — SIGNIFICANT CHANGE UP (ref 3.5–5.3)
POTASSIUM SERPL-MCNC: 4.1 MMOL/L — SIGNIFICANT CHANGE UP (ref 3.5–5.3)
POTASSIUM SERPL-MCNC: 4.4 MMOL/L — SIGNIFICANT CHANGE UP (ref 3.5–5.3)
POTASSIUM SERPL-SCNC: 4.1 MMOL/L — SIGNIFICANT CHANGE UP (ref 3.5–5.3)
POTASSIUM SERPL-SCNC: 4.1 MMOL/L — SIGNIFICANT CHANGE UP (ref 3.5–5.3)
POTASSIUM SERPL-SCNC: 4.4 MMOL/L — SIGNIFICANT CHANGE UP (ref 3.5–5.3)
RBC # BLD: 3.6 M/UL — LOW (ref 4.2–5.8)
RBC # FLD: 16.3 % — HIGH (ref 10.3–14.5)
SODIUM SERPL-SCNC: 134 MMOL/L — LOW (ref 135–145)
SODIUM SERPL-SCNC: 134 MMOL/L — LOW (ref 135–145)
SODIUM SERPL-SCNC: 136 MMOL/L — SIGNIFICANT CHANGE UP (ref 135–145)
WBC # BLD: 7.09 K/UL — SIGNIFICANT CHANGE UP (ref 3.8–10.5)
WBC # FLD AUTO: 7.09 K/UL — SIGNIFICANT CHANGE UP (ref 3.8–10.5)

## 2024-01-31 PROCEDURE — 99233 SBSQ HOSP IP/OBS HIGH 50: CPT

## 2024-01-31 PROCEDURE — 99232 SBSQ HOSP IP/OBS MODERATE 35: CPT

## 2024-01-31 RX ORDER — ALLOPURINOL 300 MG
100 TABLET ORAL DAILY
Refills: 0 | Status: DISCONTINUED | OUTPATIENT
Start: 2024-01-31 | End: 2024-02-05

## 2024-01-31 RX ORDER — SODIUM,POTASSIUM PHOSPHATES 278-250MG
1 POWDER IN PACKET (EA) ORAL ONCE
Refills: 0 | Status: COMPLETED | OUTPATIENT
Start: 2024-01-31 | End: 2024-01-31

## 2024-01-31 RX ORDER — MAGNESIUM SULFATE 500 MG/ML
2 VIAL (ML) INJECTION ONCE
Refills: 0 | Status: COMPLETED | OUTPATIENT
Start: 2024-01-31 | End: 2024-01-31

## 2024-01-31 RX ORDER — FUROSEMIDE 40 MG
20 TABLET ORAL DAILY
Refills: 0 | Status: DISCONTINUED | OUTPATIENT
Start: 2024-01-31 | End: 2024-02-05

## 2024-01-31 RX ORDER — HEPARIN SODIUM 5000 [USP'U]/ML
5000 INJECTION INTRAVENOUS; SUBCUTANEOUS EVERY 8 HOURS
Refills: 0 | Status: DISCONTINUED | OUTPATIENT
Start: 2024-01-31 | End: 2024-02-05

## 2024-01-31 RX ORDER — SENNA PLUS 8.6 MG/1
2 TABLET ORAL AT BEDTIME
Refills: 0 | Status: DISCONTINUED | OUTPATIENT
Start: 2024-01-31 | End: 2024-02-05

## 2024-01-31 RX ORDER — POLYETHYLENE GLYCOL 3350 17 G/17G
17 POWDER, FOR SOLUTION ORAL DAILY
Refills: 0 | Status: DISCONTINUED | OUTPATIENT
Start: 2024-01-31 | End: 2024-02-05

## 2024-01-31 RX ADMIN — Medication 2000 MILLIGRAM(S): at 05:28

## 2024-01-31 RX ADMIN — Medication 1 PACKET(S): at 04:20

## 2024-01-31 RX ADMIN — SENNA PLUS 2 TABLET(S): 8.6 TABLET ORAL at 22:12

## 2024-01-31 RX ADMIN — LEVETIRACETAM 250 MILLIGRAM(S): 250 TABLET, FILM COATED ORAL at 18:39

## 2024-01-31 RX ADMIN — DONEPEZIL HYDROCHLORIDE 5 MILLIGRAM(S): 10 TABLET, FILM COATED ORAL at 22:31

## 2024-01-31 RX ADMIN — SERTRALINE 50 MILLIGRAM(S): 25 TABLET, FILM COATED ORAL at 11:17

## 2024-01-31 RX ADMIN — HEPARIN SODIUM 5000 UNIT(S): 5000 INJECTION INTRAVENOUS; SUBCUTANEOUS at 18:38

## 2024-01-31 RX ADMIN — Medication 20 MILLIGRAM(S): at 11:31

## 2024-01-31 RX ADMIN — Medication 2000 MILLIGRAM(S): at 22:12

## 2024-01-31 RX ADMIN — ATORVASTATIN CALCIUM 40 MILLIGRAM(S): 80 TABLET, FILM COATED ORAL at 22:12

## 2024-01-31 RX ADMIN — Medication 2000 MILLIGRAM(S): at 15:09

## 2024-01-31 RX ADMIN — LEVETIRACETAM 250 MILLIGRAM(S): 250 TABLET, FILM COATED ORAL at 05:28

## 2024-01-31 RX ADMIN — POLYETHYLENE GLYCOL 3350 17 GRAM(S): 17 POWDER, FOR SOLUTION ORAL at 11:16

## 2024-01-31 RX ADMIN — Medication 25 MILLIGRAM(S): at 05:28

## 2024-01-31 RX ADMIN — QUETIAPINE FUMARATE 12.5 MILLIGRAM(S): 200 TABLET, FILM COATED ORAL at 22:11

## 2024-01-31 RX ADMIN — Medication 25 GRAM(S): at 04:19

## 2024-01-31 RX ADMIN — Medication 25 MILLIGRAM(S): at 18:38

## 2024-01-31 RX ADMIN — Medication 3 MILLIGRAM(S): at 22:12

## 2024-01-31 RX ADMIN — MEMANTINE HYDROCHLORIDE 5 MILLIGRAM(S): 10 TABLET ORAL at 11:17

## 2024-01-31 NOTE — PROGRESS NOTE ADULT - PROBLEM SELECTOR PLAN 1
- pt appears fluid overloaded, however oxygen saturation is stable on room air in no acute distress.   - ProBNP 3158   - GDMT: start diuresis with lasix 40mg x1, will assess if pt is able to tolerate Lasix. c/w metoprolol 25 mg BID PO.   - TTE EF 70-75%, elevated filling pressure. severe pulmonary artery pressure  - Monitor on telemetry.  Strict i/o and daily weights.  Keep K > 4, Mg > 2.  Monitor renal function with ongoing diuresis.

## 2024-01-31 NOTE — DIETITIAN INITIAL EVALUATION ADULT - PERTINENT LABORATORY DATA
01-31    134<L>  |  100  |  20.6<H>  ----------------------------<  132<H>  4.4   |  24.0  |  1.02    Ca    8.4      31 Jan 2024 02:34  Phos  2.4     01-31  Mg     1.8     01-31    TPro  5.4<L>  /  Alb  3.3  /  TBili  0.5  /  DBili  x   /  AST  14  /  ALT  12  /  AlkPhos  88  01-29  POCT Blood Glucose.: 120 mg/dL (01-30-24 @ 12:51)

## 2024-01-31 NOTE — PROGRESS NOTE ADULT - ASSESSMENT
89 y/o male with Hx of HTN, HLD, prostate cancer, dementia and afib on eliquis presenting after a mechanical fall this morning. Per EMS the patient was walking in the hallway when he fell backwards and struck his head against the wall/the floor. He was noted to possibly have some confusion as compared to his baseline per his nursing facility, He has a small occipital head lac that is not actively bleeding, imaging in ED done showed Acute subdural hematoma at the right vertex, measuring 4-5 mm in maximal thickness. No underlying mass effect or vasogenic edema. No acute calvarial fracture visualized, admitted under Trauma Service and was seen by Neurosurgery team, medicine consult for [facility] Trauma eval     Plan:     Identification of Seniors at Risk:                                                                                                                                       Before the event that brought you to the hospital, did you need someone to help you on a regular basis?    unable to answer   In the 24 hours before your injury, have you needed more help than usual?                                                 Unable to anser          Have you been hospitalized for one or more nights during the past six months?                                         Unable to answer   In general, do you have serious problems with your vision that cannot be corrected with glasses?                Unable to anser   In general, do you have serious problems with your memory?                                                                    yes he has dementia and he is pleasantly confused               Do you take six or more different medications every day?                                                                          yes                    A positive screen is an answer of yes to 2 or more - Total:     his screening is positive    would recommend   -r/o syncope   -orthostatic vitals   -cardiac monitoring   -cardiology consulted   -cycle trops negative  -PT and OT eval   -TSH and vitamin b12 ok  -TTE done showed Left ventricular systolic function is normal with an ejection fraction visually estimated at 70 to 75 %, Estimated pulmonary artery systolic pressure is 68 mmHg, consistent with elevated pulmonary artery pressure.    Fall/  Acute subdural hematoma at the right vertex  -neurochecks   -Neurosurgery eval   -SCDs for DVT ppx  -Pain control  -PT evaluation   -speech therapy eval   -pain meds and DVT prophylaxis    -wound care as has laceration on occipital region  -Tetanus shot  -serial imaging   -pain meds   -DVT ppx  as per Primary team   -Holding asa and Eliquis  -if worsening neurological status or repeat imaging shows worsening bleeding might need single donor platelets and mixed cryo  on Keppra for seizure prophylaxis   Fall risk and precautions.   -wound care of scalp     Hyponatremia:  Sodium level 134, being resumed on IV lasix, give hypervolumia, will continue to monitor BMP, Na is stable ~134      Hx of Dementia: would continue with Donepezil and Memantine.     Hx of Insomnia: Will continue with Melatonin    Hx of Anxiety: will continue with Sertraline 50mg daily.     Hx of HTN and Afib: will continue with Atenolol 50 mg daily with holding parameters, Eliquis is on hold    Hx of HLD: will continue with Atorvastatin 40mg daily.     Hx of Peripheral edema/? hx of CHF: on lasix, will hold it for now, he has edema     Hx of Prostate Ca: Will monitor for rentention.      medicine team is singing off, please call as needed

## 2024-01-31 NOTE — OCCUPATIONAL THERAPY INITIAL EVALUATION ADULT - ADDITIONAL COMMENTS
Pt poor historian, unable to provide social hx. Recommend social work to clarify all info including PLOF, set up of home/bathroom, DME owned/used, level of assist needed PTA and level of assist available upon discharge. Per EMR: Pt has had several ED visits recently for falls however, no previous therapy evaluation to refer to. Per previous  consult, [facility] is from the hospitals (? dementia unit).

## 2024-01-31 NOTE — DIETITIAN INITIAL EVALUATION ADULT - OTHER INFO
Patient is an 87 y/o male with a PMH of HTN, HLD, prostate cancer, dementia and afib on eliquis presenting after a mechanical fall this morning. Per EMS the patient was walking in the hallway when he fell backwards and struck his head against the wall/the floor. He was noted to possibly have some confusion as compared to his baseline per his nursing facility. Pt found to have small occipital head lac that was not actively bleeding. CTH 1/30 at noon showed slight worsening of SDH; repeat scan at 6PM found to be stable.

## 2024-01-31 NOTE — OCCUPATIONAL THERAPY INITIAL EVALUATION ADULT - GENERAL OBSERVATIONS, REHAB EVAL
Left pt as received, semifowler in bed, NAD, confused, +IV, +Tele//BP, +condom cath, +b/l VCB, +b/l mittens on RA A&Ox1(self/ only). Pre/post pain 0/10. Pt agreeable to OT evaluation

## 2024-01-31 NOTE — PROGRESS NOTE ADULT - SUBJECTIVE AND OBJECTIVE BOX
Patient is a 88y old  Male who presents with a chief complaint of Fall, SDH (31 Jan 2024 04:58)    HPI:  Patient is an 89 y/o male with a PMH of HTN, HLD, prostate cancer, dementia and afib on eliquis presenting after a mechanical fall this morning. Per EMS the patient was walking in the hallway when he fell backwards and struck his head against the wall/the floor. He was noted to possibly have some confusion as compared to his baseline per his nursing facility. Upon arrival he has no complaints and denies headache or pain anywhere, He has a small occipital head lac that is not actively bleeding.     Patient seen at the bedside and evaluated. After speaking more with the facility the patient is mentating at his baseline and his last doses of aspirin and eliquis were this morning.  (29 Jan 2024 13:01)      Interval history:  Patient seen and examined by neurosurgery team. Patient had repeat CTH last night which was stable. Patient reports he feels well, denies HA, weakness, numbness. No other acute events reported.       Vital Signs Last 24 Hrs  T(C): 36.8 (31 Jan 2024 07:42), Max: 36.8 (31 Jan 2024 07:42)  T(F): 98.3 (31 Jan 2024 07:42), Max: 98.3 (31 Jan 2024 07:42)  HR: 86 (31 Jan 2024 09:00) (79 - 126)  BP: 122/97 (31 Jan 2024 09:00) (87/67 - 177/164)  BP(mean): 105 (31 Jan 2024 09:00) (39 - 170)  RR: 15 (31 Jan 2024 09:00) (12 - 22)  SpO2: 98% (31 Jan 2024 09:00) (94% - 98%)    Parameters below as of 31 Jan 2024 08:00  Patient On (Oxygen Delivery Method): room air      Physical Exam:  Constitutional: NAD, lying in bed  Neuro  * Mental Status:  GCS 15: Awake, alert, oriented to conversation. No aphasia or difficulty speaking. No dysarthria. Intermittent confusion.   * Cranial Nerves: Cnii-Cnxii grossly intact. PERRL, EOMI, tongue midline, no gaze deviation  * Motor: RUE 5/5, LUE 5/5, RLE 5/5, LLE 5/5, no drift  * Sensory: Sensation intact to light touch  * Reflexes: not assessed       LABS:                        10.6   7.09  )-----------( 180      ( 31 Jan 2024 02:34 )             31.7     01-31    134<L>  |  100  |  20.6<H>  ----------------------------<  132<H>  4.4   |  24.0  |  1.02    Ca    8.4      31 Jan 2024 02:34  Phos  2.4     01-31  Mg     1.8     01-31    TPro  5.4<L>  /  Alb  3.3  /  TBili  0.5  /  DBili  x   /  AST  14  /  ALT  12  /  AlkPhos  88  01-29    PT/INR - ( 30 Jan 2024 02:54 )   PT: 17.6 sec;   INR: 1.61 ratio    PTT - ( 30 Jan 2024 02:54 )  PTT:34.7 sec      Medications:  MEDICATIONS  (STANDING):  atorvastatin 40 milliGRAM(s) Oral at bedtime  ceFAZolin  Injectable. 2000 milliGRAM(s) IV Push every 8 hours  diphtheria/tetanus Vaccine - Adult 0.5 milliLiter(s) IntraMuscular once  donepezil 5 milliGRAM(s) Oral at bedtime  furosemide    Tablet 20 milliGRAM(s) Oral daily  levETIRAcetam 250 milliGRAM(s) Oral two times a day  melatonin 3 milliGRAM(s) Oral at bedtime  memantine 5 milliGRAM(s) Oral daily  metoprolol tartrate 25 milliGRAM(s) Oral two times a day  polyethylene glycol 3350 17 Gram(s) Oral daily  QUEtiapine 12.5 milliGRAM(s) Oral at bedtime  senna 2 Tablet(s) Oral at bedtime  sertraline 50 milliGRAM(s) Oral daily    MEDICATIONS  (PRN):      RADIOLOGY & ADDITIONAL STUDIES:  < from: CT Head No Cont (01.30.24 @ 18:20) >  FINDINGS:    Similar thin high right frontal acute subdural hemorrhage.    Similar low density bilateral lateral convexity subdural collections.    Similar mild rightward midline shift. No effacement of basal cisterns. No   hydrocephalus.    No acute parenchymal hemorrhage or brain edema.    Small air-fluid levelin the left maxillary sinus. Remaining visualized   paranasal sinuses and mastoid air cells are clear.    IMPRESSION:    No significant interval change from CT brain 1/30/2024 obtained at 12:07   PM    --- End of Report ---  RENARD MORALES MD; Attending Radiologist  This document has been electronically signed. Jan 31 2024  8:36AM    < end of copied text >

## 2024-01-31 NOTE — OCCUPATIONAL THERAPY INITIAL EVALUATION ADULT - IMPAIRED TRANSFERS: SIT/STAND, REHAB EVAL
impaired balance/cognition/impaired coordination/impaired motor control/narrow base of support/impaired postural control/decreased ROM/decreased strength

## 2024-01-31 NOTE — PROGRESS NOTE ADULT - SUPERVISING ATTENDING
I personally reviewed the patient's imaging. History and plan discussed with CONNIE Ontiveros, agree with above.
I personally reviewed the patient's imaging. History and plan discussed with CONNIE Zaragoza, agree with above.

## 2024-01-31 NOTE — PROGRESS NOTE ADULT - ASSESSMENT
Assessment:   88M with PMH Afib on Eliquis, dementia, HLD who presented s/p fall and confusion, found to have R vertex SDH. Initial repeat CTH was stable, but 24 hour CTH was read as worsening. Another repeat CTH performed which was also read as stable. No other acute events reported.       Plan:  - Discussed with Dr. Zaragoza  - Ok for Q4 hour neuro checks  - SBP goal normotensive   - Keppra 250 mg x7 days   - Hold ASA and Eliquis until follow up with Dr. Zaragoza in 4 weeks  - Further care per primary team   - No further neurosurgical needs at this time, please re-call as needed

## 2024-01-31 NOTE — PROGRESS NOTE ADULT - SUBJECTIVE AND OBJECTIVE BOX
ELSI CASTAÑEDA    [id number]    88y      Male    Patient is a 88y old  Male who presents with a chief complaint of Fall, SDH (31 Jan 2024 10:33)      INTERVAL HPI/OVERNIGHT EVENTS:    Patient is pleasantly confused, denies any pain , has no sob or chest pain    Vital Signs Last 24 Hrs  T(C): 36.7 (31 Jan 2024 11:00), Max: 36.8 (31 Jan 2024 07:42)  T(F): 98.1 (31 Jan 2024 11:00), Max: 98.3 (31 Jan 2024 07:42)  HR: 77 (31 Jan 2024 12:00) (76 - 126)  BP: 115/65 (31 Jan 2024 12:00) (87/67 - 177/164)  BP(mean): 81 (31 Jan 2024 12:00) (39 - 170)  RR: 16 (31 Jan 2024 12:00) (12 - 22)  SpO2: 97% (31 Jan 2024 12:00) (94% - 98%)    Parameters below as of 31 Jan 2024 12:00  Patient On (Oxygen Delivery Method): room air        PHYSICAL EXAM:    GENERAL: Elderly male looking pleasantly confused   HEENT: has scalp laceration at occipital region   NECK: supple   CHEST/LUNG: Clear to auscultate bilaterally; No wheezing  HEART: S1S2+, Regular rate and rhythm; No murmurs  ABDOMEN: Soft, Nontender, Nondistended; Bowel sounds present  EXTREMITIES:  1+ Peripheral Pulses, No edema  SKIN: No rashes or lesions  NEURO: pleasantly confused, following commends       LABS:                        10.6   7.09  )-----------( 180      ( 31 Jan 2024 02:34 )             31.7     01-31    134<L>  |  100  |  20.6<H>  ----------------------------<  132<H>  4.4   |  24.0  |  1.02    Ca    8.4      31 Jan 2024 02:34  Phos  2.4     01-31  Mg     1.8     01-31      PT/INR - ( 30 Jan 2024 02:54 )   PT: 17.6 sec;   INR: 1.61 ratio         PTT - ( 30 Jan 2024 02:54 )  PTT:34.7 sec        I&O's Summary    30 Jan 2024 07:01  -  31 Jan 2024 07:00  --------------------------------------------------------  IN: 1012 mL / OUT: 800 mL / NET: 212 mL    31 Jan 2024 07:01  -  31 Jan 2024 12:30  --------------------------------------------------------  IN: 237 mL / OUT: 225 mL / NET: 12 mL        MEDICATIONS  (STANDING):  atorvastatin 40 milliGRAM(s) Oral at bedtime  ceFAZolin  Injectable. 2000 milliGRAM(s) IV Push every 8 hours  diphtheria/tetanus Vaccine - Adult 0.5 milliLiter(s) IntraMuscular once  donepezil 5 milliGRAM(s) Oral at bedtime  furosemide    Tablet 20 milliGRAM(s) Oral daily  heparin   Injectable 5000 Unit(s) SubCutaneous every 8 hours  levETIRAcetam 250 milliGRAM(s) Oral two times a day  melatonin 3 milliGRAM(s) Oral at bedtime  memantine 5 milliGRAM(s) Oral daily  metoprolol tartrate 25 milliGRAM(s) Oral two times a day  polyethylene glycol 3350 17 Gram(s) Oral daily  QUEtiapine 12.5 milliGRAM(s) Oral at bedtime  senna 2 Tablet(s) Oral at bedtime  sertraline 50 milliGRAM(s) Oral daily    MEDICATIONS  (PRN):

## 2024-01-31 NOTE — PROGRESS NOTE ADULT - ASSESSMENT
89 y/o M with hx of HTN, HLD, prostate cancer, dementia, and AFib on Eliquis presenting after a mechanical fall in the nursing facility. Per EMS, pt was walking in the hallway where he fell backwards and struck his head against the wall/floor. He was noted to have some confusion in comparison to his baseline per nursing facility. Patient is found to have a small R frontal SDH.

## 2024-01-31 NOTE — OCCUPATIONAL THERAPY INITIAL EVALUATION ADULT - LEVEL OF INDEPENDENCE: BED TO CHAIR, REHAB EVAL
unsafe at this time 2* pt poor balance, limited cognition and decreased safety awareness/unable to perform

## 2024-01-31 NOTE — DIETITIAN INITIAL EVALUATION ADULT - ADD RECOMMEND
Add Ensure Plus HP TID (350 kcals, 20 g pro each)  Provide Magic Cup BID (290 kcals, 9 g pro each)  Rx: MVI daily to optimize nutrition  Encourage and assist with meals as needed  If concern for swallowing difficulty, consider SLP evaluation

## 2024-01-31 NOTE — DIETITIAN INITIAL EVALUATION ADULT - PERTINENT MEDS FT
MEDICATIONS  (STANDING):  ceFAZolin  Injectable. 2000 milliGRAM(s) IV Push every 8 hours  furosemide    Tablet 20 milliGRAM(s) Oral daily  polyethylene glycol 3350 17 Gram(s) Oral daily  QUEtiapine 12.5 milliGRAM(s) Oral at bedtime  senna 2 Tablet(s) Oral at bedtime  sertraline 50 milliGRAM(s) Oral daily

## 2024-01-31 NOTE — PROGRESS NOTE ADULT - ASSESSMENT
ASSESSMENT:  Patient is an 87 y/o male with a PMH of HTN, HLD, prostate cancer, dementia, and afib (on eliquis), presented after fall on 1/29, found to have 4-5mm SDH.     PLAN:    NEURO:   #Right vertex SDH  #H/o dementia  -Currently, GCS:14 (E3/V4/M6), A&O x 2  - CTH 1/30 at 6PM was stable   - Continue Keppra for seizure prophylaxis x 7 days (through 2 / 5)   - Continue home Donepezil, Namenda, and Sertraline; 12.5mg Seroquel added at bedtime for agitation   - Tertiary exam performed 1/30  - Geritraic trauma consulted 1/29    CV:   #H/o Afib   #HFpEF   -TTE 1/30: EF 70-75%, RA severely dilated, elevated pulmonary artery pressure, mild TR   -Cardiology consulted, appreciate recommendations; f/u pro-BNP  -Continue Metoprolol 12.5 BID & lipitor  -Home lasix and potassium supplementation held d/t hyponatremia and rising potassium   -MARCIAL-VASc score:3 points; will hold home Eliquis for now iso SDH     PULM:   #B/l Pleural Effusions  -CT chest with b/l pleural effusions, R>L, currently satting well on RA  - CTM respiratory status, goal SpO2> 92%  - Incentive spirometer for compressive atelectasis      GI:   -Regular diet  -Monitor for bowel movement    RENAL:   - Voiding spontaneously   - Monitor UOP and CMP  - Replete electrolytes prn, goal Mag > 2, Phos > 3, K >4    HEME:  - LE duplex 1/30 negative for DVT  - Will plan to start chemoprophylaxis 24 hours after stable CTH (1/31 6PM)    ID:   #RLE cellulitis  -Continue Cefazolin x 5 days for cellulitis  -Monitor WBC and fever curve     ENDO:   -No active issues, goal glucose <180    SKIN:   -Lines: PIVs  -2cm scalp laceration, stapled on 1/29  -Antibiotics for RLE cellulitis as above    DISPO: SICU; consider downgrade to floor level of care

## 2024-01-31 NOTE — PROGRESS NOTE ADULT - CRITICAL CARE ATTENDING COMMENT
x Repeat CT yesterday somewhat worsened. Repeat 6 hours later appears stable.    GEN: Awake        -Repeat CT appears stable per NSGY.  D/W NSGY who said that they are happy with current scan and we can liberalize neuro checks  -Currently HD stable, some degree of right sided heart failure on TTE and noted this am on POCUS (per team) to have non variable IVC (without B lines), patient has been off home lasix -would re-start home meds (pro BNP has been elevated)  -Breathing comfortably on RA, but no B lines noted on lung US  -H/H stable, duplex negative, scds, OK to start chemoprophylaxis when patient is 24 hours post stable scan  -Afebrile, WBC normal, remains on cefazolin for 5days total for cellulitis  -Tolerating diet - fed by team, no BM, will start bowel regimen  -Voiding, Cr slightly increased  -Na now stable  -Now fluids off Repeat CT yesterday somewhat worsened. Repeat 6 hours later appears stable.    GEN: Awake, eyes open spontaneously, speech is clear although somewhat confused, oriented to person, hospital and fall today but not month or season, moves all extremities  HEAD:  Posterior occiput w/vertical stapled laceration, right posterior/inferior chin w/resolving ecchymosis, left cheek w/dried scabbing of wound - appears superficial -unchanged  CVS:  Irregularly irregular  PUL:  Decreased at bases b/l but otherwise clear, right inframammary fold w/ecchymosis noted in band-like fashion.  ABD:  Soft, non tender, non distended  EXT:  Warm, edema at ankles up to upper calves, R leg w/ovoid area (anterior) of erythema and warmth -appears to be waning in nature    Fall  Traumatic SDH  R leg cellulitis    -Repeat CT appears stable per NSGY.  D/W NSGY who said that they are happy with current scan and we can liberalize neuro checks  -Currently HD stable, some degree of right sided heart failure on TTE and noted this am on POCUS (per team) to have non variable IVC (without B lines), patient has been off home lasix -would re-start home meds (pro BNP has been elevated) -would consider IV lasix dosing x 1 if no significant response to oral diuretic, appreciate Cardiology input - who will review w/patient regarding need for ongoing long term AC in setting of ongoing falls  -Breathing comfortably on RA, but no B lines noted on lung US -but in setting of elevated BNP -would watch for signs of further volume overload (respiratory distress)  -H/H stable, duplex negative, scds, OK to start chemoprophylaxis when patient is 24 hours post stable scan  -Afebrile, WBC normal, remains on cefazolin for 5days total for cellulitis  -Tolerating diet - fed by team, no BM, will start bowel regimen  -Voiding, Cr slightly increased  -Na now stable  -OOB w/PT  -Now fluids off    Will transfer patient to step down unit today for further monitoring.

## 2024-01-31 NOTE — PROGRESS NOTE ADULT - SUBJECTIVE AND OBJECTIVE BOX
HPI:  Patient is an 89 y/o male with a PMH of HTN, HLD, prostate cancer, dementia and afib on eliquis presenting after a mechanical fall this morning. Per EMS the patient was walking in the hallway when he fell backwards and struck his head against the wall/the floor. He was noted to possibly have some confusion as compared to his baseline per his nursing facility. Upon arrival he has no complaints and denies headache or pain anywhere, He has a small occipital head lac that is not actively bleeding.    (29 Jan 2024 13:01)      INTERVAL EVENTS:   CTH 1/30 at noon showed slight worsening of SDH; repeat scan at 6PM found to be stable.     Subjective:   Patient denies headache, visual changes, chest pain, SOB, abdominal pain, or any other acute complaints.    PHYSICAL EXAM:    Constitutional: NAD, well-groomed, well-developed  HEENT: PERRL, EOMI, no drainage or redness  Neck: No JVD  Respiratory: Breath Sounds equal & clear to auscultation, no accessory muscle use  Cardiovascular: Irregular rate; normal S1, S2; no murmurs, gallops or rubs   Gastrointestinal: Soft, non-tender, no hepatosplenomegaly, normal bowel sounds  Extremities: 1+ pitting edema to b/l LE; anterior aspect of RLE with poorly demarcated erythema and warmth; No cyanosis, clubbing   Vascular: Equal and normal pulses: 2+ peripheral pulses throughout  Neurological: GCS:14 (E3/V4/M6). A&O x 2; no sensory, motor or coordination deficits, normal reflexes  Psychiatric: Normal mood, normal affect  Musculoskeletal: No joint pain, swelling or deformity; no limitation of movement  Skin: Anterior aspect of RLE with area of poorly demarcated erythema and warmth        MEDICATIONS  (STANDING):  atorvastatin 40 milliGRAM(s) Oral at bedtime  ceFAZolin  Injectable. 2000 milliGRAM(s) IV Push every 8 hours  diphtheria/tetanus Vaccine - Adult 0.5 milliLiter(s) IntraMuscular once  donepezil 5 milliGRAM(s) Oral at bedtime  levETIRAcetam 250 milliGRAM(s) Oral two times a day  melatonin 3 milliGRAM(s) Oral at bedtime  memantine 5 milliGRAM(s) Oral daily  metoprolol tartrate 25 milliGRAM(s) Oral two times a day  QUEtiapine 12.5 milliGRAM(s) Oral at bedtime  sertraline 50 milliGRAM(s) Oral daily  sodium chloride 0.9%. 1000 milliLiter(s) (50 mL/Hr) IV Continuous <Continuous>    MEDICATIONS  (PRN):          ICU Vital Signs Last 24 Hrs  T(C): 36.7 (30 Jan 2024 23:24), Max: 36.7 (30 Jan 2024 20:34)  T(F): 98.1 (30 Jan 2024 23:24), Max: 98.1 (30 Jan 2024 20:34)  HR: 108 (31 Jan 2024 04:00) (72 - 126)  BP: 117/59 (31 Jan 2024 04:00) (89/59 - 152/76)  BP(mean): 74 (31 Jan 2024 04:00) (39 - 134)  ABP: --  ABP(mean): --  RR: 14 (31 Jan 2024 04:00) (12 - 19)  SpO2: 95% (31 Jan 2024 04:00) (93% - 98%)    O2 Parameters below as of 31 Jan 2024 04:00  Patient On (Oxygen Delivery Method): room air                I&O's Detail    29 Jan 2024 07:01  -  30 Jan 2024 07:00  --------------------------------------------------------  IN:  Total IN: 0 mL    OUT:    Voided (mL): 100 mL  Total OUT: 100 mL    Total NET: -100 mL      30 Jan 2024 07:01  -  31 Jan 2024 04:59  --------------------------------------------------------  IN:    Oral Fluid: 312 mL    sodium chloride 0.9%: 550 mL  Total IN: 862 mL    OUT:    Voided (mL): 800 mL  Total OUT: 800 mL    Total NET: 62 mL                LABS:  CBC Full  -  ( 31 Jan 2024 02:34 )  WBC Count : 7.09 K/uL  RBC Count : 3.60 M/uL  Hemoglobin : 10.6 g/dL  Hematocrit : 31.7 %  Platelet Count - Automated : 180 K/uL  Mean Cell Volume : 88.1 fl  Mean Cell Hemoglobin : 29.4 pg  Mean Cell Hemoglobin Concentration : 33.4 gm/dL  Auto Neutrophil # : 4.75 K/uL  Auto Lymphocyte # : 1.15 K/uL  Auto Monocyte # : 0.91 K/uL  Auto Eosinophil # : 0.20 K/uL  Auto Basophil # : 0.05 K/uL  Auto Neutrophil % : 67.1 %  Auto Lymphocyte % : 16.2 %  Auto Monocyte % : 12.8 %  Auto Eosinophil % : 2.8 %  Auto Basophil % : 0.7 %    01-31    134<L>  |  100  |  20.6<H>  ----------------------------<  132<H>  4.4   |  24.0  |  1.02    Ca    8.4      31 Jan 2024 02:34  Phos  2.4     01-31  Mg     1.8     01-31    TPro  5.4<L>  /  Alb  3.3  /  TBili  0.5  /  DBili  x   /  AST  14  /  ALT  12  /  AlkPhos  88  01-29    PT/INR - ( 30 Jan 2024 02:54 )   PT: 17.6 sec;   INR: 1.61 ratio         PTT - ( 30 Jan 2024 02:54 )  PTT:34.7 sec  Urinalysis Basic - ( 31 Jan 2024 02:34 )    Color: x / Appearance: x / SG: x / pH: x  Gluc: 132 mg/dL / Ketone: x  / Bili: x / Urobili: x   Blood: x / Protein: x / Nitrite: x   Leuk Esterase: x / RBC: x / WBC x   Sq Epi: x / Non Sq Epi: x / Bacteria: x

## 2024-01-31 NOTE — OCCUPATIONAL THERAPY INITIAL EVALUATION ADULT - LEVEL OF INDEPENDENCE: SIT/STAND, REHAB EVAL
attempted 2x, poor standing balance unable to achieve full upright posture only approximately 25%/maximum assist (25% patients effort)

## 2024-01-31 NOTE — OCCUPATIONAL THERAPY INITIAL EVALUATION ADULT - TRANSFER SAFETY CONCERNS NOTED: SIT/STAND, REHAB EVAL
decreased safety awareness/decreased proprioception/decreased sequencing ability/decreased weight-shifting ability

## 2024-01-31 NOTE — PROGRESS NOTE ADULT - SUBJECTIVE AND OBJECTIVE BOX
[facility]                                                         CARDIOLOGY AT [place] [address]                                                         Telephone: [phone]. Fax:[phone]                                                                             PROGRESS NOTE    Reason for follow up: HF and anticoagulation   Update: Pt is awake and alert, responsive to questions. Overnight, per RN, pt had an episode of agitation where he was trying to climb out of bed and his HR went up to 130s-140s. HR is currently in the 80-90s. Pt is tolerating PO intake well.       Review of symptoms:   Cardiac:  No chest pain. No dyspnea. No palpitations.  Respiratory: no cough. No dyspnea  Gastrointestinal: No diarrhea. No abdominal pain. No bleeding.   Neuro: No focal neuro complaints.    Vitals:  T(C): 36.8 (01-31-24 @ 07:42), Max: 36.8 (01-31-24 @ 07:42)  HR: 93 (01-31-24 @ 10:00) (79 - 126)  BP: 113/68 (01-31-24 @ 10:00) (87/67 - 177/164)  RR: 14 (01-31-24 @ 10:00) (12 - 22)  SpO2: 96% (01-31-24 @ 10:00) (94% - 98%)  Wt(kg): --  I&O's Summary    30 Jan 2024 07:01  -  31 Jan 2024 07:00  --------------------------------------------------------  IN: 1012 mL / OUT: 800 mL / NET: 212 mL    31 Jan 2024 07:01  -  31 Jan 2024 10:33  --------------------------------------------------------  IN: 237 mL / OUT: 125 mL / NET: 112 mL      Weight (kg): 83.9 (01-29 @ 11:33)    PHYSICAL EXAM:  Appearance: Comfortable. No acute distress  HEENT:  Atraumatic. Normocephalic.  Normal oral mucosa  Neurologic: A & O x 3, no gross focal deficits.  Cardiovascular:  Irregularly irregular, No murmur, no rubs/gallops. No JVD  Respiratory: Lungs clear to auscultation, unlabored   Gastrointestinal:  Soft, Non-tender, + BS  Lower Extremities: 2+ Peripheral Pulses, No clubbing, cyanosis. B/L LE 2+ edema  Psychiatry: Patient is calm. No agitation.   Skin: warm and dry.    CURRENT CARDIAC MEDICATIONS:  furosemide    Tablet 20 milliGRAM(s) Oral daily  metoprolol tartrate 25 milliGRAM(s) Oral two times a day      CURRENT OTHER MEDICATIONS:  ceFAZolin  Injectable. 2000 milliGRAM(s) IV Push every 8 hours  donepezil 5 milliGRAM(s) Oral at bedtime  levETIRAcetam 250 milliGRAM(s) Oral two times a day  melatonin 3 milliGRAM(s) Oral at bedtime  memantine 5 milliGRAM(s) Oral daily  QUEtiapine 12.5 milliGRAM(s) Oral at bedtime  sertraline 50 milliGRAM(s) Oral daily  polyethylene glycol 3350 17 Gram(s) Oral daily  senna 2 Tablet(s) Oral at bedtime  atorvastatin 40 milliGRAM(s) Oral at bedtime  diphtheria/tetanus Vaccine - Adult 0.5 milliLiter(s) IntraMuscular once, Stop order after: 1 Doses      LABS:	 	  ( 30 Jan 2024 02:54 )  Troponin T  X    ,  CPK  62   , CKMB  X    , BNP X                                  10.6   7.09  )-----------( 180      ( 31 Jan 2024 02:34 )             31.7     01-31    134<L>  |  100  |  20.6<H>  ----------------------------<  132<H>  4.4   |  24.0  |  1.02    Ca    8.4      31 Jan 2024 02:34  Phos  2.4     01-31  Mg     1.8     01-31    TPro  5.4<L>  /  Alb  3.3  /  TBili  0.5  /  DBili  x   /  AST  14  /  ALT  12  /  AlkPhos  88  01-29    PT/INR/PTT ( 30 Jan 2024 02:54 )                       :                       :      17.6         :       34.7                  .        .                   .              .           .       1.61        .                                       Lipid Profile:   HgA1c:   TSH: Thyroid Stimulating Hormone, Serum: 1.19 uIU/mL      TELEMETRY: AFib (overnight, -140s but now HR is in the 80-90s)   ECG: AFib     DIAGNOSTIC TESTING:  [x ] Echocardiogram:   < from: TTE W or WO Ultrasound Enhancing Agent (01.29.24 @ 21:34) >        1. Left ventricular systolic function is normal with an ejection fraction visually estimated at 70 to 75 %.   2. Elevated filling pressure.   3. The right ventricle is not well visualized. Based on visual assessment, the right ventricle appears mildly enlarged. probably normal systolic function.   4. The right atrium is severely dilated in size.   5. Mild tricuspid regurgitation.   6. Estimated pulmonary artery systolic pressure is 68 mmHg, consistent with elevated pulmonary artery pressure.   7. Aortic root at the sinuses ofValsalva is dilated, measuring 3.90 cm (indexed 1.93 cm/m²).   8. No prior echocardiogram is available for comparison.      < end of copied text >  [ x ]  Catheterization:  angioplasty 20 years ago.    [ ] Stress Test:    OTHER:

## 2024-01-31 NOTE — OCCUPATIONAL THERAPY INITIAL EVALUATION ADULT - DIAGNOSIS, OT EVAL
88 year old Male PMH HTN, prostate cancer, Afib on Eliquis, dementia, HLD who presented s/p fall and confusion, found to have R vertex SDH. Initial repeat CTH was stable, but 24 hour CTH was read as worsening. Another repeat CTH performed which was also read as stable. No other acute events reported. negative

## 2024-01-31 NOTE — OCCUPATIONAL THERAPY INITIAL EVALUATION ADULT - RANGE OF MOTION EXAMINATION, UPPER EXTREMITY
unable to assess formally AROM 2*impaired cognition. BUE PROM appears WFL? pt not cooperating with exam -muscle guarding? vs muscle tightness?

## 2024-02-01 DIAGNOSIS — I50.31 ACUTE DIASTOLIC (CONGESTIVE) HEART FAILURE: ICD-10-CM

## 2024-02-01 LAB
ANION GAP SERPL CALC-SCNC: 13 MMOL/L — SIGNIFICANT CHANGE UP (ref 5–17)
BUN SERPL-MCNC: 21 MG/DL — HIGH (ref 8–20)
CALCIUM SERPL-MCNC: 8.7 MG/DL — SIGNIFICANT CHANGE UP (ref 8.4–10.5)
CHLORIDE SERPL-SCNC: 99 MMOL/L — SIGNIFICANT CHANGE UP (ref 96–108)
CO2 SERPL-SCNC: 27 MMOL/L — SIGNIFICANT CHANGE UP (ref 22–29)
CREAT SERPL-MCNC: 1.11 MG/DL — SIGNIFICANT CHANGE UP (ref 0.5–1.3)
EGFR: 64 ML/MIN/1.73M2 — SIGNIFICANT CHANGE UP
GLUCOSE SERPL-MCNC: 136 MG/DL — HIGH (ref 70–99)
HCT VFR BLD CALC: 32.4 % — LOW (ref 39–50)
HGB BLD-MCNC: 10.5 G/DL — LOW (ref 13–17)
MAGNESIUM SERPL-MCNC: 1.9 MG/DL — SIGNIFICANT CHANGE UP (ref 1.6–2.6)
MCHC RBC-ENTMCNC: 29.2 PG — SIGNIFICANT CHANGE UP (ref 27–34)
MCHC RBC-ENTMCNC: 32.4 GM/DL — SIGNIFICANT CHANGE UP (ref 32–36)
MCV RBC AUTO: 90 FL — SIGNIFICANT CHANGE UP (ref 80–100)
PHOSPHATE SERPL-MCNC: 3.4 MG/DL — SIGNIFICANT CHANGE UP (ref 2.4–4.7)
PLATELET # BLD AUTO: 195 K/UL — SIGNIFICANT CHANGE UP (ref 150–400)
POTASSIUM SERPL-MCNC: 3.8 MMOL/L — SIGNIFICANT CHANGE UP (ref 3.5–5.3)
POTASSIUM SERPL-SCNC: 3.8 MMOL/L — SIGNIFICANT CHANGE UP (ref 3.5–5.3)
RBC # BLD: 3.6 M/UL — LOW (ref 4.2–5.8)
RBC # FLD: 16.2 % — HIGH (ref 10.3–14.5)
SODIUM SERPL-SCNC: 139 MMOL/L — SIGNIFICANT CHANGE UP (ref 135–145)
T3 SERPL-MCNC: 60 NG/DL — LOW (ref 80–200)
T4 AB SER-ACNC: 5.7 UG/DL — SIGNIFICANT CHANGE UP (ref 4.5–12)
TSH SERPL-MCNC: 1.65 UIU/ML — SIGNIFICANT CHANGE UP (ref 0.27–4.2)
WBC # BLD: 6.78 K/UL — SIGNIFICANT CHANGE UP (ref 3.8–10.5)
WBC # FLD AUTO: 6.78 K/UL — SIGNIFICANT CHANGE UP (ref 3.8–10.5)

## 2024-02-01 PROCEDURE — 71045 X-RAY EXAM CHEST 1 VIEW: CPT | Mod: 26

## 2024-02-01 PROCEDURE — 93970 EXTREMITY STUDY: CPT | Mod: 26

## 2024-02-01 PROCEDURE — 99233 SBSQ HOSP IP/OBS HIGH 50: CPT

## 2024-02-01 RX ORDER — POTASSIUM CHLORIDE 20 MEQ
20 PACKET (EA) ORAL ONCE
Refills: 0 | Status: COMPLETED | OUTPATIENT
Start: 2024-02-01 | End: 2024-02-01

## 2024-02-01 RX ORDER — MAGNESIUM SULFATE 500 MG/ML
2 VIAL (ML) INJECTION ONCE
Refills: 0 | Status: COMPLETED | OUTPATIENT
Start: 2024-02-01 | End: 2024-02-01

## 2024-02-01 RX ORDER — FUROSEMIDE 40 MG
40 TABLET ORAL ONCE
Refills: 0 | Status: COMPLETED | OUTPATIENT
Start: 2024-02-01 | End: 2024-02-01

## 2024-02-01 RX ADMIN — LEVETIRACETAM 250 MILLIGRAM(S): 250 TABLET, FILM COATED ORAL at 17:37

## 2024-02-01 RX ADMIN — Medication 20 MILLIEQUIVALENT(S): at 15:30

## 2024-02-01 RX ADMIN — HEPARIN SODIUM 5000 UNIT(S): 5000 INJECTION INTRAVENOUS; SUBCUTANEOUS at 10:19

## 2024-02-01 RX ADMIN — Medication 2000 MILLIGRAM(S): at 15:30

## 2024-02-01 RX ADMIN — HEPARIN SODIUM 5000 UNIT(S): 5000 INJECTION INTRAVENOUS; SUBCUTANEOUS at 01:08

## 2024-02-01 RX ADMIN — Medication 3 MILLIGRAM(S): at 21:48

## 2024-02-01 RX ADMIN — DONEPEZIL HYDROCHLORIDE 5 MILLIGRAM(S): 10 TABLET, FILM COATED ORAL at 21:48

## 2024-02-01 RX ADMIN — MEMANTINE HYDROCHLORIDE 5 MILLIGRAM(S): 10 TABLET ORAL at 12:30

## 2024-02-01 RX ADMIN — LEVETIRACETAM 250 MILLIGRAM(S): 250 TABLET, FILM COATED ORAL at 06:06

## 2024-02-01 RX ADMIN — Medication 25 MILLIGRAM(S): at 17:37

## 2024-02-01 RX ADMIN — SENNA PLUS 2 TABLET(S): 8.6 TABLET ORAL at 21:47

## 2024-02-01 RX ADMIN — POLYETHYLENE GLYCOL 3350 17 GRAM(S): 17 POWDER, FOR SOLUTION ORAL at 12:30

## 2024-02-01 RX ADMIN — SERTRALINE 50 MILLIGRAM(S): 25 TABLET, FILM COATED ORAL at 12:30

## 2024-02-01 RX ADMIN — Medication 20 MILLIGRAM(S): at 06:06

## 2024-02-01 RX ADMIN — Medication 2000 MILLIGRAM(S): at 21:47

## 2024-02-01 RX ADMIN — Medication 100 MILLIGRAM(S): at 12:29

## 2024-02-01 RX ADMIN — Medication 25 GRAM(S): at 15:30

## 2024-02-01 RX ADMIN — ATORVASTATIN CALCIUM 40 MILLIGRAM(S): 80 TABLET, FILM COATED ORAL at 21:47

## 2024-02-01 RX ADMIN — Medication 2000 MILLIGRAM(S): at 06:06

## 2024-02-01 RX ADMIN — Medication 40 MILLIGRAM(S): at 10:19

## 2024-02-01 RX ADMIN — Medication 25 MILLIGRAM(S): at 06:06

## 2024-02-01 RX ADMIN — HEPARIN SODIUM 5000 UNIT(S): 5000 INJECTION INTRAVENOUS; SUBCUTANEOUS at 17:37

## 2024-02-01 NOTE — PROGRESS NOTE ADULT - SUBJECTIVE AND OBJECTIVE BOX
[facility]                                                         CARDIOLOGY AT [facility]                                                                  [address] [address]                                                         Telephone: [phone]. Fax:[phone]                                                                             PROGRESS NOTE    Reason for follow up: acute HFpEF, afib  Update: confused      Review of symptoms:   Cardiac:  No chest pain. No dyspnea. No palpitations.  Respiratory: no cough. No dyspnea  Gastrointestinal: No diarrhea. No abdominal pain. No bleeding.   Neuro: No focal neuro complaints.    Vitals:  T(C): 37.3 (02-01-24 @ 08:05), Max: 37.3 (02-01-24 @ 08:05)  HR: 105 (02-01-24 @ 08:00) (76 - 113)  BP: 114/67 (02-01-24 @ 08:00) (113/68 - 142/74)  RR: 15 (02-01-24 @ 08:00) (14 - 22)  SpO2: 92% (02-01-24 @ 08:00) (92% - 99%)  Wt(kg): --  I&O's Summary    31 Jan 2024 07:01  -  01 Feb 2024 07:00  --------------------------------------------------------  IN: 357 mL / OUT: 550 mL / NET: -193 mL      Weight (kg): 83.9 (01-29 @ 11:33)    PHYSICAL EXAM:  Appearance: Comfortable. No acute distress  HEENT:  Atraumatic. Normocephalic.  Normal oral mucosa  Neurologic: A & O x 3, no gross focal deficits.  Cardiovascular: irregular S1 S2, No murmur, no rubs/gallops. No JVD  Respiratory: Lungs diminished to auscultation, unlabored   Gastrointestinal:  Soft, Non-tender, + BS  Lower Extremities: 2+ Peripheral Pulses, No clubbing, cyanosis, +edema  Psychiatry: Patient is calm. No agitation.   Skin: warm and dry.    CURRENT CARDIAC MEDICATIONS:  furosemide    Tablet 20 milliGRAM(s) Oral daily  metoprolol tartrate 25 milliGRAM(s) Oral two times a day      CURRENT OTHER MEDICATIONS:  ceFAZolin  Injectable. 2000 milliGRAM(s) IV Push every 8 hours  donepezil 5 milliGRAM(s) Oral at bedtime  levETIRAcetam 250 milliGRAM(s) Oral two times a day  melatonin 3 milliGRAM(s) Oral at bedtime  memantine 5 milliGRAM(s) Oral daily  sertraline 50 milliGRAM(s) Oral daily  polyethylene glycol 3350 17 Gram(s) Oral daily  senna 2 Tablet(s) Oral at bedtime  allopurinol 100 milliGRAM(s) Oral daily  atorvastatin 40 milliGRAM(s) Oral at bedtime  diphtheria/tetanus Vaccine - Adult 0.5 milliLiter(s) IntraMuscular once, Stop order after: 1 Doses  heparin   Injectable 5000 Unit(s) SubCutaneous every 8 hours      LABS:	 	  ( 30 Jan 2024 02:54 )  Troponin T  X    ,  CPK  62   , CKMB  X    , BNP X                                  10.6   7.09  )-----------( 180      ( 31 Jan 2024 02:34 )             31.7     01-31    136  |  100  |  20.1<H>  ----------------------------<  128<H>  4.1   |  26.0  |  1.02    Ca    8.3<L>      31 Jan 2024 17:15  Phos  3.1     01-31  Mg     2.2     01-31      PT/INR/PTT ( 30 Jan 2024 02:54 )                       :                       :      17.6         :       34.7                  .        .                   .              .           .       1.61        .                                       Lipid Profile:   HgA1c:   TSH: Thyroid Stimulating Hormone, Serum: 1.19 uIU/mL      TELEMETRY: afib, occasional tachy episodes up to 120      DIAGNOSTIC TESTING:  [ ] Echocardiogram:   < from: TTE W or WO Ultrasound Enhancing Agent (01.29.24 @ 21:34) >  CONCLUSIONS:      1. Left ventricular systolic function is normal with an ejection fraction visually estimated at 70 to 75 %.   2. Elevated filling pressure.   3. The right ventricle is not well visualized. Based on visual assessment, the right ventricle appears mildly enlarged. probably normal systolic function.   4. The right atrium is severely dilated in size.   5. Mild tricuspid regurgitation.   6. Estimated pulmonary artery systolic pressure is 68 mmHg, consistent with elevated pulmonary artery pressure.   7. Aortic root at the sinuses ofValsalva is dilated, measuring 3.90 cm (indexed 1.93 cm/m²).   8. No prior echocardiogram is available for comparison.    < end of copied text >                                                                    [facility]                                                         CARDIOLOGY AT [facility]                                                                  [address] [facility] [place] [place] [address]- [facility]                                                         Telephone: [phone]. Fax:[phone]                                                                             PROGRESS NOTE    Reason for follow up: acute HFpEF, afib  Update: confused      Review of symptoms:   Unable to obtain     Vitals:  T(C): 37.3 (02-01-24 @ 08:05), Max: 37.3 (02-01-24 @ 08:05)  HR: 105 (02-01-24 @ 08:00) (76 - 113)  BP: 114/67 (02-01-24 @ 08:00) (113/68 - 142/74)  RR: 15 (02-01-24 @ 08:00) (14 - 22)  SpO2: 92% (02-01-24 @ 08:00) (92% - 99%)  Wt(kg): --  I&O's Summary    31 Jan 2024 07:01  -  01 Feb 2024 07:00  --------------------------------------------------------  IN: 357 mL / OUT: 550 mL / NET: -193 mL      Weight (kg): 83.9 (01-29 @ 11:33)    PHYSICAL EXAM:  Appearance: Comfortable. No acute distress  HEENT:  Atraumatic. Normocephalic.  Normal oral mucosa  Neurologic: A & O x 3, no gross focal deficits.  Cardiovascular: irregular S1 S2, No murmur, no rubs/gallops. No JVD  Respiratory: Lungs diminished to auscultation, unlabored   Gastrointestinal:  Soft, Non-tender, + BS  Lower Extremities: 2+ Peripheral Pulses, No clubbing, cyanosis, +edema  Psychiatry: Patient is calm. No agitation.   Skin: warm and dry.    CURRENT CARDIAC MEDICATIONS:  furosemide    Tablet 20 milliGRAM(s) Oral daily  metoprolol tartrate 25 milliGRAM(s) Oral two times a day      CURRENT OTHER MEDICATIONS:  ceFAZolin  Injectable. 2000 milliGRAM(s) IV Push every 8 hours  donepezil 5 milliGRAM(s) Oral at bedtime  levETIRAcetam 250 milliGRAM(s) Oral two times a day  melatonin 3 milliGRAM(s) Oral at bedtime  memantine 5 milliGRAM(s) Oral daily  sertraline 50 milliGRAM(s) Oral daily  polyethylene glycol 3350 17 Gram(s) Oral daily  senna 2 Tablet(s) Oral at bedtime  allopurinol 100 milliGRAM(s) Oral daily  atorvastatin 40 milliGRAM(s) Oral at bedtime  diphtheria/tetanus Vaccine - Adult 0.5 milliLiter(s) IntraMuscular once, Stop order after: 1 Doses  heparin   Injectable 5000 Unit(s) SubCutaneous every 8 hours      LABS:	 	  ( 30 Jan 2024 02:54 )  Troponin T  X    ,  CPK  62   , CKMB  X    , BNP X                                  10.6   7.09  )-----------( 180      ( 31 Jan 2024 02:34 )             31.7     01-31    136  |  100  |  20.1<H>  ----------------------------<  128<H>  4.1   |  26.0  |  1.02    Ca    8.3<L>      31 Jan 2024 17:15  Phos  3.1     01-31  Mg     2.2     01-31      PT/INR/PTT ( 30 Jan 2024 02:54 )                       :                       :      17.6         :       34.7                  .        .                   .              .           .       1.61        .                                       Lipid Profile:   HgA1c:   TSH: Thyroid Stimulating Hormone, Serum: 1.19 uIU/mL      TELEMETRY: afib, occasional tachy episodes up to 120      DIAGNOSTIC TESTING:  [ ] Echocardiogram:   < from: TTE W or WO Ultrasound Enhancing Agent (01.29.24 @ 21:34) >  CONCLUSIONS:      1. Left ventricular systolic function is normal with an ejection fraction visually estimated at 70 to 75 %.   2. Elevated filling pressure.   3. The right ventricle is not well visualized. Based on visual assessment, the right ventricle appears mildly enlarged. probably normal systolic function.   4. The right atrium is severely dilated in size.   5. Mild tricuspid regurgitation.   6. Estimated pulmonary artery systolic pressure is 68 mmHg, consistent with elevated pulmonary artery pressure.   7. Aortic root at the sinuses ofValsalva is dilated, measuring 3.90 cm (indexed 1.93 cm/m²).   8. No prior echocardiogram is available for comparison.    < end of copied text >

## 2024-02-01 NOTE — PROGRESS NOTE ADULT - CRITICAL CARE ATTENDING COMMENT
x Restarted home lasix yesterday.    GEN: Awakens to voice, somewhat confused, oriented to person, moves all extremities  HEAD:  Posterior occiput w/vertical stapled laceration, right posterior/inferior chin w/resolving ecchymosis, left cheek w/dried scabbing of wound - appears superficial -unchanged  CVS:  Irregularly irregular  PUL:  Decreased at bases b/l but otherwise clear  ABD:  Soft, non tender, non distended  EXT:  Warm, edema at ankles up to upper calves, R leg w/ovoid area (anterior) of erythema and warmth -markedly improved today    Fall  Traumatic SDH  R leg cellulitis    -Neuro exam unchanged  -Tachycardia noted today, check chemistry and TSH -replete lytes?, remains on metoprolol and now on home lasix, appreciate cardiology input  -Breathing comfortably on RA  -H/H stable, scds, sqh  -Diet as tolerated  -Voiding, Cr stable  -Cefazolin for cellulitis, WBC normal, afebrile  -Staples in scalp lac  -Na improved  -Geriatric evaluation  -Tertiary exam completed  Patient is a DNR/DNI  If HR can be controlled/improved would be stable for transfer from critical care setting.    Note delayed secondary to SICU acuity. Restarted home lasix yesterday.    GEN: Awakens to voice, somewhat confused, oriented to person, moves all extremities  HEAD:  Posterior occiput w/vertical stapled laceration, right posterior/inferior chin w/resolving ecchymosis, left cheek w/dried scabbing of wound - appears superficial -unchanged  CVS:  Irregularly irregular  PUL:  Decreased at bases b/l but otherwise clear  ABD:  Soft, non tender, non distended  EXT:  Warm, edema at ankles up to upper calves, R leg w/ovoid area (anterior) of erythema and warmth -markedly improved today    Fall  Traumatic SDH  R leg cellulitis    -Neuro exam unchanged  -Tachycardia noted today, check chemistry and TSH -replete lytes?, remains on metoprolol and now on home lasix, appreciate cardiology input, duplex on admission was negative  -Breathing comfortably on RA  -H/H stable, scds, sqh  -Diet as tolerated  -Voiding, Cr stable  -Cefazolin for cellulitis, WBC normal, afebrile  -Staples in scalp lac  -Na improved  -Geriatric evaluation  -Tertiary exam completed  Patient is a DNR/DNI  If HR can be controlled/improved would be stable for transfer from critical care setting.    Note delayed secondary to SICU acuity.

## 2024-02-01 NOTE — CHART NOTE - NSCHARTNOTEFT_GEN_A_CORE
SICU TRANSFER NOTE  -----------------------------  ICU Admission Date: 1/29/24  Transfer Date: 02-01-24 @ 10:14    Admission Diagnosis: Subdural Hemorrhage     Active Problems/injuries:   1. 4-5 mm SDH  2. B/L pleural effusions   3. Right leg cellulitis     Procedures:   1. Laceration repair on 1/29 in the ED    Consultants:  [ X ] Cardiology: Consulted for acute on chronic HF  [ ] Endocrine  [ ] Infectious Disease  [ X ] Medicine: Consulted for geriatric trauma evaluation   [ X ] Neurosurgery: Consulted for recommendations regarding right vertex SDH. Now signed off.   [ ] Ortho       [ ] Weight Bearing Status:  [ ] Palliative       [ ] Advanced Directives:    [ ] Physical Medicine and Rehab       [ ] Disposition :   [ ] Plastics  [ ] Pulmonary    Medications  allopurinol 100 milliGRAM(s) Oral daily  atorvastatin 40 milliGRAM(s) Oral at bedtime  ceFAZolin  Injectable. 2000 milliGRAM(s) IV Push every 8 hours  diphtheria/tetanus Vaccine - Adult 0.5 milliLiter(s) IntraMuscular once  donepezil 5 milliGRAM(s) Oral at bedtime  furosemide    Tablet 20 milliGRAM(s) Oral daily  furosemide   Injectable 40 milliGRAM(s) IV Push once  heparin   Injectable 5000 Unit(s) SubCutaneous every 8 hours  levETIRAcetam 250 milliGRAM(s) Oral two times a day  melatonin 3 milliGRAM(s) Oral at bedtime  memantine 5 milliGRAM(s) Oral daily  metoprolol tartrate 25 milliGRAM(s) Oral two times a day  polyethylene glycol 3350 17 Gram(s) Oral daily  senna 2 Tablet(s) Oral at bedtime  sertraline 50 milliGRAM(s) Oral daily      Antibiotics:  ceFAZolin  Injectable. 2000 milliGRAM(s) IV Push every 8 hours    Indication: Cellulitis End Date: 2/3/24      I have discussed this case with SICU upon transfer and all questions regarding ICU course were answered.    The following items are to be followed up:    NEURO:  #SDH:   - Stable, remains neurologically intact  - Continue with q4 hour neuro checks   - Keppra x 7 days  #Dementia  -Continue all home meds including Donepezil, Namenda, and Sertraline; and 12.5mg Seroquel for bedtime    CARDIO:  #Afib:  - Continue Metoprolol for rate control 12.5 BID   #CHF  - Continue lasix and potassium   - Per NS, hold ASA and eliquis until follow up with Dr. Bravo in 4 weeks     PULMONARY:  - Incentive spirometer for compressive atelectasis      GI:  - Regular diet, supervised meals   - Bowel regimen with senna and miralax   - Monitor for bowel movements    :  - Voiding spontaneously, continue with lasix   - Monitor I/Os    HEME:  - SQH for dvt ppx   - SCDs    SKIN:  #RLE cellulitis - Continue Cefazolin x 5 days for cellulitis  #2cm posterior scalp laceration, stapled on 1/29, remove in 3-5 days.

## 2024-02-01 NOTE — PROGRESS NOTE ADULT - PROBLEM SELECTOR PLAN 1
-No prior history, presumed new onset  -Has LE edema, mildly volume up  -Unable to obtain symptomatolgy 2/2 mental status  -BNP 3.1K  -Net negative   -CT chest from 1/29 with BL pleural effusions  -C/w PO diuretics, BB -No prior history, presumed new onset  -Has LE edema, mildly volume up  -Unable to obtain symptomatolgy 2/2 mental status  -BNP 3.1K  -Net negative   -CT chest from 1/29 with BL pleural effusions  -C/w PO diuretics, BB  -Will order one dose of IVP lasix 40mg today

## 2024-02-01 NOTE — CHART NOTE - NSCHARTNOTEFT_GEN_A_CORE
SICU TRANSFER NOTE  -----------------------------  ICU Admission Date: 1/29/24  Transfer Date: 02-01-24 @ 10:14    Admission Diagnosis: Subdural Hemorrhage     Active Problems/injuries:   1. 4-5 mm SDH  2. B/L pleural effusions   3. Right leg cellulitis     Procedures:   1. Laceration repair on 1/29 in the ED    Consultants:  [ X ] Cardiology: Consulted for possible acute on chronic HF  [ ] Endocrine  [ ] Infectious Disease  [ X ] Medicine: Consulted for geriatric trauma evaluation   [ X ] Neurosurgery: Consulted for recommendations regarding right vertex SDH. Now signed off.   [ ] Ortho       [ ] Weight Bearing Status:  [ ] Palliative       [ ] Advanced Directives:    [ ] Physical Medicine and Rehab       [ ] Disposition :   [ ] Plastics  [ ] Pulmonary    Medications  allopurinol 100 milliGRAM(s) Oral daily  atorvastatin 40 milliGRAM(s) Oral at bedtime  ceFAZolin  Injectable. 2000 milliGRAM(s) IV Push every 8 hours  diphtheria/tetanus Vaccine - Adult 0.5 milliLiter(s) IntraMuscular once  donepezil 5 milliGRAM(s) Oral at bedtime  furosemide    Tablet 20 milliGRAM(s) Oral daily  furosemide   Injectable 40 milliGRAM(s) IV Push once  heparin   Injectable 5000 Unit(s) SubCutaneous every 8 hours  levETIRAcetam 250 milliGRAM(s) Oral two times a day  melatonin 3 milliGRAM(s) Oral at bedtime  memantine 5 milliGRAM(s) Oral daily  metoprolol tartrate 25 milliGRAM(s) Oral two times a day  polyethylene glycol 3350 17 Gram(s) Oral daily  senna 2 Tablet(s) Oral at bedtime  sertraline 50 milliGRAM(s) Oral daily      [X] I attest I have reviewed and reconciled all medications prior to transfer    Antibiotics:  ceFAZolin  Injectable. 2000 milliGRAM(s) IV Push every 8 hours    Indication: Cellulitis End Date: 2/3/24      I have discussed this case with [email or web address] [email or web address] upon transfer and all questions regarding ICU course were answered.    The following items are to be followed up:    NEURO:  #SDH:   - Stable, remains neurologically intact  - Continue with q4 hour neuro checks   - Keppra x 7 days  #Dementia  -Continue all home meds including Donepezil, Namenda, and Sertraline; and 12.5mg Seroquel for bedtime    CARDIO:  #Afib:  - Continue Metoprolol for rate control 12.5 BID   #CHF  - Continue lasix and potassium   - Per NS, hold ASA and eliquis until follow up with Dr. Herrera in 4 weeks     PULMONARY:  - Incentive spirometer for compressive atelectasis      GI:  - Regular diet  - Monitor for bowel movements  - Voiding spontaneously, continue lasix   - LE duplex 1/30 negative for DVT  - SQH for dvt ppx   - RLE cellulitis - Continue Cefazolin x 5 days for cellulitis  - 2cm scalp laceration, stapled on 1/29 SICU TRANSFER NOTE  -----------------------------  ICU Admission Date: 1/29/24  Transfer Date: 02-01-24 @ 10:14    Admission Diagnosis: Subdural Hemorrhage     Active Problems/injuries:   1. 4-5 mm SDH  2. B/L pleural effusions   3. Right leg cellulitis     Procedures:   1. Laceration repair on 1/29 in the ED    Consultants:  [ X ] Cardiology: Consulted for acute on chronic HF  [ ] Endocrine  [ ] Infectious Disease  [ X ] Medicine: Consulted for geriatric trauma evaluation   [ X ] Neurosurgery: Consulted for recommendations regarding right vertex SDH. Now signed off.   [ ] Ortho       [ ] Weight Bearing Status:  [ ] Palliative       [ ] Advanced Directives:    [ ] Physical Medicine and Rehab       [ ] Disposition :   [ ] Plastics  [ ] Pulmonary    Medications  allopurinol 100 milliGRAM(s) Oral daily  atorvastatin 40 milliGRAM(s) Oral at bedtime  ceFAZolin  Injectable. 2000 milliGRAM(s) IV Push every 8 hours  diphtheria/tetanus Vaccine - Adult 0.5 milliLiter(s) IntraMuscular once  donepezil 5 milliGRAM(s) Oral at bedtime  furosemide    Tablet 20 milliGRAM(s) Oral daily  furosemide   Injectable 40 milliGRAM(s) IV Push once  heparin   Injectable 5000 Unit(s) SubCutaneous every 8 hours  levETIRAcetam 250 milliGRAM(s) Oral two times a day  melatonin 3 milliGRAM(s) Oral at bedtime  memantine 5 milliGRAM(s) Oral daily  metoprolol tartrate 25 milliGRAM(s) Oral two times a day  polyethylene glycol 3350 17 Gram(s) Oral daily  senna 2 Tablet(s) Oral at bedtime  sertraline 50 milliGRAM(s) Oral daily      [X] I attest I have reviewed and reconciled all medications prior to transfer    Antibiotics:  ceFAZolin  Injectable. 2000 milliGRAM(s) IV Push every 8 hours    Indication: Cellulitis End Date: 2/3/24      I have discussed this case with ACS trauma upon transfer and all questions regarding ICU course were answered.    The following items are to be followed up:    NEURO:  #SDH:   - Stable, remains neurologically intact  - Continue with q4 hour neuro checks   - Keppra x 7 days  #Dementia  -Continue all home meds including Donepezil, Namenda, and Sertraline; and 12.5mg Seroquel for bedtime    CARDIO:  #Afib:  - Continue Metoprolol for rate control 12.5 BID   #CHF  - Continue lasix and potassium   - Per NS, hold ASA and eliquis until follow up with Dr. Herrera in 4 weeks     PULMONARY:  - Incentive spirometer for compressive atelectasis      GI:  - Regular diet, supervised meals   - Bowel regimen with senna and miralax   - Monitor for bowel movements    :  - Voiding spontaneously, continue with lasix   -Monitor I&Os    HEME:  - SQH for dvt ppx   - SCDs    SKIN:  #RLE cellulitis - Continue Cefazolin x 5 days for cellulitis  #2cm scalp laceration, stapled on 1/29, remove in 3-5 days

## 2024-02-01 NOTE — PROGRESS NOTE ADULT - SUBJECTIVE AND OBJECTIVE BOX
INTERVAL HPI/OVERNIGHT EVENTS: Patient slept well overnight. Remains confused however, nonfocal and able to follow commands.  Intermittently able to make needs known.  CT scan stable.  Vitals remain stable.  Voiding, hyponatremia resolved yesterday.       PAST MEDICAL & SURGICAL HISTORY:      MEDICATIONS  (STANDING):  allopurinol 100 milliGRAM(s) Oral daily  atorvastatin 40 milliGRAM(s) Oral at bedtime  ceFAZolin  Injectable. 2000 milliGRAM(s) IV Push every 8 hours  diphtheria/tetanus Vaccine - Adult 0.5 milliLiter(s) IntraMuscular once  donepezil 5 milliGRAM(s) Oral at bedtime  furosemide    Tablet 20 milliGRAM(s) Oral daily  heparin   Injectable 5000 Unit(s) SubCutaneous every 8 hours  levETIRAcetam 250 milliGRAM(s) Oral two times a day  melatonin 3 milliGRAM(s) Oral at bedtime  memantine 5 milliGRAM(s) Oral daily  metoprolol tartrate 25 milliGRAM(s) Oral two times a day  polyethylene glycol 3350 17 Gram(s) Oral daily  QUEtiapine 12.5 milliGRAM(s) Oral at bedtime  senna 2 Tablet(s) Oral at bedtime  sertraline 50 milliGRAM(s) Oral daily    MEDICATIONS  (PRN):      ICU Vital Signs Last 24 Hrs  T(C): 36.4 (01 Feb 2024 00:04), Max: 36.9 (31 Jan 2024 13:05)  T(F): 97.5 (01 Feb 2024 00:04), Max: 98.4 (31 Jan 2024 13:05)  HR: 98 (01 Feb 2024 00:00) (76 - 117)  BP: 117/93 (01 Feb 2024 00:00) (87/67 - 177/164)  BP(mean): 102 (01 Feb 2024 00:00) (62 - 170)  ABP: --  ABP(mean): --  RR: 17 (01 Feb 2024 00:00) (12 - 22)  SpO2: 97% (01 Feb 2024 00:00) (94% - 99%)    O2 Parameters below as of 01 Feb 2024 00:00  Patient On (Oxygen Delivery Method): room air            Drug Dosing Weight  Height (cm): 177.8 (29 Jan 2024 11:33)  Weight (kg): 83.9 (29 Jan 2024 11:33)  BMI (kg/m2): 26.5 (29 Jan 2024 11:33)  BSA (m2): 2.02 (29 Jan 2024 11:33)    CENTRAL LINE: [ ] YES [ ] NO  LOCATION:   DATE INSERTED:  REMOVE: [ ] YES [ ] NO  EXPLAIN:    MARCH: [ ] YES [ ] NO    DATE INSERTED:  REMOVE: [ ] YES [ ] NO  EXPLAIN:    A-LINE: [ ] YES [ ] NO  LOCATION:   DATE INSERTED:  REMOVE: [ ] YES [ ] NO  EXPLAIN:        I&O's Detail    30 Jan 2024 07:01  -  31 Jan 2024 07:00  --------------------------------------------------------  IN:    Oral Fluid: 312 mL    sodium chloride 0.9%: 700 mL  Total IN: 1012 mL    OUT:    Voided (mL): 800 mL  Total OUT: 800 mL    Total NET: 212 mL      31 Jan 2024 07:01  -  01 Feb 2024 02:32  --------------------------------------------------------  IN:    Oral Fluid: 237 mL  Total IN: 237 mL    OUT:    Voided (mL): 225 mL  Total OUT: 225 mL    Total NET: 12 mL              Physical Exam:    Neurological:  GCS  15, AAOx3    CAM neg        No focal neuro deficits    HEENT: PERRL, EOMI     Respiratory: Unlabored, cta b/l, no accessory muscle use    Cardiovascular: Afib    Gastrointestinal: Soft, nttp, nd    Extremities: No peripheral edema    Skin: No rashes    LABS:  CBC Full  -  ( 31 Jan 2024 02:34 )  WBC Count : 7.09 K/uL  RBC Count : 3.60 M/uL  Hemoglobin : 10.6 g/dL  Hematocrit : 31.7 %  Platelet Count - Automated : 180 K/uL  Mean Cell Volume : 88.1 fl  Mean Cell Hemoglobin : 29.4 pg  Mean Cell Hemoglobin Concentration : 33.4 gm/dL  Auto Neutrophil # : 4.75 K/uL  Auto Lymphocyte # : 1.15 K/uL  Auto Monocyte # : 0.91 K/uL  Auto Eosinophil # : 0.20 K/uL  Auto Basophil # : 0.05 K/uL  Auto Neutrophil % : 67.1 %  Auto Lymphocyte % : 16.2 %  Auto Monocyte % : 12.8 %  Auto Eosinophil % : 2.8 %  Auto Basophil % : 0.7 %    01-31    136  |  100  |  20.1<H>  ----------------------------<  128<H>  4.1   |  26.0  |  1.02    Ca    8.3<L>      31 Jan 2024 17:15  Phos  3.1     01-31  Mg     2.2     01-31      PT/INR - ( 30 Jan 2024 02:54 )   PT: 17.6 sec;   INR: 1.61 ratio         PTT - ( 30 Jan 2024 02:54 )  PTT:34.7 sec  Urinalysis Basic - ( 31 Jan 2024 17:15 )    Color: x / Appearance: x / SG: x / pH: x  Gluc: 128 mg/dL / Ketone: x  / Bili: x / Urobili: x   Blood: x / Protein: x / Nitrite: x   Leuk Esterase: x / RBC: x / WBC x   Sq Epi: x / Non Sq Epi: x / Bacteria: x

## 2024-02-02 DIAGNOSIS — I62.00 NONTRAUMATIC SUBDURAL HEMORRHAGE, UNSPECIFIED: ICD-10-CM

## 2024-02-02 DIAGNOSIS — S06.5XAA TRAUMATIC SUBDURAL HEMORRHAGE WITH LOSS OF CONSCIOUSNESS STATUS UNKNOWN, INITIAL ENCOUNTER: ICD-10-CM

## 2024-02-02 LAB
ALBUMIN SERPL ELPH-MCNC: 3.2 G/DL — LOW (ref 3.3–5.2)
ALP SERPL-CCNC: 91 U/L — SIGNIFICANT CHANGE UP (ref 40–120)
ALT FLD-CCNC: <5 U/L — SIGNIFICANT CHANGE UP
ANION GAP SERPL CALC-SCNC: 11 MMOL/L — SIGNIFICANT CHANGE UP (ref 5–17)
AST SERPL-CCNC: 11 U/L — SIGNIFICANT CHANGE UP
BILIRUB DIRECT SERPL-MCNC: 0.2 MG/DL — SIGNIFICANT CHANGE UP (ref 0–0.3)
BILIRUB INDIRECT FLD-MCNC: 0.4 MG/DL — SIGNIFICANT CHANGE UP (ref 0.2–1)
BILIRUB SERPL-MCNC: 0.6 MG/DL — SIGNIFICANT CHANGE UP (ref 0.4–2)
BUN SERPL-MCNC: 27.7 MG/DL — HIGH (ref 8–20)
CALCIUM SERPL-MCNC: 8.4 MG/DL — SIGNIFICANT CHANGE UP (ref 8.4–10.5)
CHLORIDE SERPL-SCNC: 102 MMOL/L — SIGNIFICANT CHANGE UP (ref 96–108)
CO2 SERPL-SCNC: 27 MMOL/L — SIGNIFICANT CHANGE UP (ref 22–29)
CREAT SERPL-MCNC: 1.17 MG/DL — SIGNIFICANT CHANGE UP (ref 0.5–1.3)
EGFR: 60 ML/MIN/1.73M2 — SIGNIFICANT CHANGE UP
GLUCOSE SERPL-MCNC: 130 MG/DL — HIGH (ref 70–99)
HCT VFR BLD CALC: 33.3 % — LOW (ref 39–50)
HGB BLD-MCNC: 10.8 G/DL — LOW (ref 13–17)
MAGNESIUM SERPL-MCNC: 2.1 MG/DL — SIGNIFICANT CHANGE UP (ref 1.6–2.6)
MCHC RBC-ENTMCNC: 29 PG — SIGNIFICANT CHANGE UP (ref 27–34)
MCHC RBC-ENTMCNC: 32.4 GM/DL — SIGNIFICANT CHANGE UP (ref 32–36)
MCV RBC AUTO: 89.3 FL — SIGNIFICANT CHANGE UP (ref 80–100)
PHOSPHATE SERPL-MCNC: 2.7 MG/DL — SIGNIFICANT CHANGE UP (ref 2.4–4.7)
PLATELET # BLD AUTO: 203 K/UL — SIGNIFICANT CHANGE UP (ref 150–400)
POTASSIUM SERPL-MCNC: 3.9 MMOL/L — SIGNIFICANT CHANGE UP (ref 3.5–5.3)
POTASSIUM SERPL-SCNC: 3.9 MMOL/L — SIGNIFICANT CHANGE UP (ref 3.5–5.3)
PREALB SERPL-MCNC: 6 MG/DL — LOW (ref 18–38)
PROT SERPL-MCNC: 6 G/DL — LOW (ref 6.6–8.7)
RBC # BLD: 3.73 M/UL — LOW (ref 4.2–5.8)
RBC # FLD: 16.6 % — HIGH (ref 10.3–14.5)
SODIUM SERPL-SCNC: 140 MMOL/L — SIGNIFICANT CHANGE UP (ref 135–145)
T4 FREE SERPL-MCNC: 1.1 NG/DL — SIGNIFICANT CHANGE UP (ref 0.9–1.8)
WBC # BLD: 6.13 K/UL — SIGNIFICANT CHANGE UP (ref 3.8–10.5)
WBC # FLD AUTO: 6.13 K/UL — SIGNIFICANT CHANGE UP (ref 3.8–10.5)

## 2024-02-02 PROCEDURE — 99233 SBSQ HOSP IP/OBS HIGH 50: CPT

## 2024-02-02 PROCEDURE — 99232 SBSQ HOSP IP/OBS MODERATE 35: CPT

## 2024-02-02 RX ORDER — ATENOLOL 25 MG/1
25 TABLET ORAL DAILY
Refills: 0 | Status: DISCONTINUED | OUTPATIENT
Start: 2024-02-02 | End: 2024-02-02

## 2024-02-02 RX ORDER — FUROSEMIDE 40 MG
40 TABLET ORAL ONCE
Refills: 0 | Status: DISCONTINUED | OUTPATIENT
Start: 2024-02-02 | End: 2024-02-02

## 2024-02-02 RX ORDER — METOPROLOL TARTRATE 50 MG
25 TABLET ORAL
Refills: 0 | Status: DISCONTINUED | OUTPATIENT
Start: 2024-02-02 | End: 2024-02-05

## 2024-02-02 RX ADMIN — POLYETHYLENE GLYCOL 3350 17 GRAM(S): 17 POWDER, FOR SOLUTION ORAL at 12:19

## 2024-02-02 RX ADMIN — HEPARIN SODIUM 5000 UNIT(S): 5000 INJECTION INTRAVENOUS; SUBCUTANEOUS at 17:12

## 2024-02-02 RX ADMIN — Medication 3 MILLIGRAM(S): at 21:55

## 2024-02-02 RX ADMIN — Medication 100 MILLIGRAM(S): at 12:19

## 2024-02-02 RX ADMIN — LEVETIRACETAM 250 MILLIGRAM(S): 250 TABLET, FILM COATED ORAL at 17:12

## 2024-02-02 RX ADMIN — SERTRALINE 50 MILLIGRAM(S): 25 TABLET, FILM COATED ORAL at 12:19

## 2024-02-02 RX ADMIN — DONEPEZIL HYDROCHLORIDE 5 MILLIGRAM(S): 10 TABLET, FILM COATED ORAL at 21:55

## 2024-02-02 RX ADMIN — Medication 25 MILLIGRAM(S): at 17:12

## 2024-02-02 RX ADMIN — ATORVASTATIN CALCIUM 40 MILLIGRAM(S): 80 TABLET, FILM COATED ORAL at 21:55

## 2024-02-02 RX ADMIN — HEPARIN SODIUM 5000 UNIT(S): 5000 INJECTION INTRAVENOUS; SUBCUTANEOUS at 02:40

## 2024-02-02 RX ADMIN — Medication 2000 MILLIGRAM(S): at 15:08

## 2024-02-02 RX ADMIN — LEVETIRACETAM 250 MILLIGRAM(S): 250 TABLET, FILM COATED ORAL at 05:39

## 2024-02-02 RX ADMIN — HEPARIN SODIUM 5000 UNIT(S): 5000 INJECTION INTRAVENOUS; SUBCUTANEOUS at 12:18

## 2024-02-02 RX ADMIN — Medication 25 MILLIGRAM(S): at 05:40

## 2024-02-02 RX ADMIN — Medication 2000 MILLIGRAM(S): at 05:39

## 2024-02-02 RX ADMIN — MEMANTINE HYDROCHLORIDE 5 MILLIGRAM(S): 10 TABLET ORAL at 12:19

## 2024-02-02 RX ADMIN — Medication 20 MILLIGRAM(S): at 05:39

## 2024-02-02 RX ADMIN — Medication 2000 MILLIGRAM(S): at 21:55

## 2024-02-02 RX ADMIN — SENNA PLUS 2 TABLET(S): 8.6 TABLET ORAL at 21:55

## 2024-02-02 NOTE — PROGRESS NOTE ADULT - SUBJECTIVE AND OBJECTIVE BOX
[facility]                                                         CARDIOLOGY AT [place] [address]                                                         Telephone: [phone]. Fax:[phone]                                                                             PROGRESS NOTE    Reason for follow up:    HFpEF  Update:   More awake today, poor po intake yesterday    Review of symptoms:   Cardiac:  No chest pain. No dyspnea. No palpitations.  Respiratory: no cough. No dyspnea  Gastrointestinal: No diarrhea. No abdominal pain. No bleeding.   Neuro: No focal neuro complaints.    Vitals:  T(C): 37.2 (02-02-24 @ 07:37), Max: 37.6 (02-02-24 @ 04:00)  HR: 96 (02-02-24 @ 08:00) (84 - 101)  BP: 134/71 (02-02-24 @ 08:00) (118/72 - 143/88)  RR: 17 (02-02-24 @ 08:00) (14 - 17)  SpO2: 95% (02-02-24 @ 08:00) (93% - 97%)    I&O's Summary  01 Feb 2024 07:01  -  02 Feb 2024 07:00  --------------------------------------------------------  IN: 330 mL / OUT: 1000 mL / NET: -670 mL  02 Feb 2024 07:01  -  02 Feb 2024 12:06  --------------------------------------------------------  IN: 180 mL / OUT: 200 mL / NET: -20 mL  Weight (kg): 83.9 (01-29 @ 11:33)    PHYSICAL EXAM:  Appearance: Comfortable. No acute distress - more awake today  HEENT:  Atraumatic. Normocephalic.  Normal oral mucosa  Neurologic: A & O x 3, no gross focal deficits.  Cardiovascular: RRR S1 S2, No murmur, no rubs/gallops. No JVD  Respiratory: Lungs clear to auscultation, unlabored   Gastrointestinal:  Soft, Non-tender, + BS  Lower Extremities: 2Peripheral Pulses, No clubbing, cyanosis, + 2 pitting edema LE/ pedal edema  Psychiatry: Patient is calm. No agitation.   Skin: warm and dry.    CURRENT CARDIAC MEDICATIONS:  furosemide    Tablet 20 milliGRAM(s) Oral daily  furosemide   Injectable 40 milliGRAM(s) IV Push once  metoprolol tartrate 25 milliGRAM(s) Oral two times a day    CURRENT OTHER MEDICATIONS:  ceFAZolin  Injectable. 2000 milliGRAM(s) IV Push every 8 hours  donepezil 5 milliGRAM(s) Oral at bedtime  levETIRAcetam 250 milliGRAM(s) Oral two times a day  melatonin 3 milliGRAM(s) Oral at bedtime  memantine 5 milliGRAM(s) Oral daily  sertraline 50 milliGRAM(s) Oral daily  bisacodyl Suppository 10 milliGRAM(s) Rectal daily PRN Constipation  polyethylene glycol 3350 17 Gram(s) Oral daily  senna 2 Tablet(s) Oral at bedtime  allopurinol 100 milliGRAM(s) Oral daily  atorvastatin 40 milliGRAM(s) Oral at bedtime  diphtheria/tetanus Vaccine - Adult 0.5 milliLiter(s) IntraMuscular once, Stop order after: 1 Doses  heparin   Injectable 5000 Unit(s) SubCutaneous every 8 hours    LABS:	 	  ( 30 Jan 2024 02:54 )  Troponin T  X    ,  CPK  62   , CKMB  X    , BNP X                            10.8   6.13  )-----------( 203      ( 02 Feb 2024 05:45 )             33.3     02-02    140  |  102  |  27.7<H>  ----------------------------<  130<H>  3.9   |  27.0  |  1.17    Ca    8.4      02 Feb 2024 05:45  Phos  2.7     02-02  Mg     2.1     02-02    TPro  6.0<L>  /  Alb  3.2<L>  /  TBili  0.6  /  DBili  0.2  /  AST  11  /  ALT  <5  /  AlkPhos  91  02-02    PT/INR/PTT ( 30 Jan 2024 02:54 )                       :                       :      17.6         :       34.7                  .        .                   .              .           .       1.61        .                                       TSH: Thyroid Stimulating Hormone, Serum: 1.65 uIU/mL  Thyroid Stimulating Hormone, Serum: 1.19 uIU/mL    TELEMETRY:   afib with rates upto 137, desats 85% occassional

## 2024-02-02 NOTE — PROGRESS NOTE ADULT - PROBLEM SELECTOR PROBLEM 1
Acute heart failure with preserved ejection fraction (HFpEF)
Acute on chronic heart failure with preserved ejection fraction (HFpEF)
Atrial fibrillation

## 2024-02-02 NOTE — CHART NOTE - NSCHARTNOTEFT_GEN_A_CORE
SICU TRANSFER NOTE  -----------------------------  ICU Admission Date: 01/29  Transfer Date: 02-02-24 @ 15:39    Admission Diagnosis:   1. 4-5 mm SDH  2. b/l pleural effusions  3. Right leg cellulitis    Active Problems/injuries:   1. 4-5 mm SDH  2. b/l pleural effusions  3. Right leg cellulitis    Consultants:  [X] Cardiology  [ ] Endocrine  [ ] Infectious Disease  [ ] Medicine  [X]Neurosurgery  [ ] Ortho        [ ] Weight Bearing Status:  [ ] Palliative       [ ] Advanced Directives:    [ ] Physical Medicine and Rehab       [ ] Disposition :   [ ] Plastics  [ ] Pulmonary    Medications  allopurinol 100 milliGRAM(s) Oral daily  atenolol  Tablet 25 milliGRAM(s) Oral daily  atorvastatin 40 milliGRAM(s) Oral at bedtime  bisacodyl Suppository 10 milliGRAM(s) Rectal daily PRN  ceFAZolin  Injectable. 2000 milliGRAM(s) IV Push every 8 hours  diphtheria/tetanus Vaccine - Adult 0.5 milliLiter(s) IntraMuscular once  donepezil 5 milliGRAM(s) Oral at bedtime  furosemide    Tablet 20 milliGRAM(s) Oral daily  heparin   Injectable 5000 Unit(s) SubCutaneous every 8 hours  levETIRAcetam 250 milliGRAM(s) Oral two times a day  melatonin 3 milliGRAM(s) Oral at bedtime  memantine 5 milliGRAM(s) Oral daily  metoprolol tartrate 25 milliGRAM(s) Oral two times a day  polyethylene glycol 3350 17 Gram(s) Oral daily  senna 2 Tablet(s) Oral at bedtime  sertraline 50 milliGRAM(s) Oral daily      [X] I attest I have reviewed and reconciled all medications prior to transfer    Antibiotics:  ceFAZolin  Injectable. 2000 milliGRAM(s) IV Push every 8 hours    Indication: Right leg cellulitis End Date: 02/03/2024    I have discussed this case with Trauma upon transfer and all questions regarding ICU course were answered.  The following items are to be followed up:  1. Monitor hemodynamic monitoring.   2. Maintain  SP02> 92%  3. Continue with DVT ppx- SCD and SQH  4. continue to monitor bowel movement.  5. Monitor urine output and kidney function- follow up ESAU kidney function looks worse.   6. incentive spirometry  7. Continue Furosemide 20mg oral daily.  8. start patient on atenolol 25 mg oral daily starting tomorrow 02/03. SICU TRANSFER NOTE  -----------------------------  ICU Admission Date: 01/29  Transfer Date: 02-02-24 @ 15:39    Admission Diagnosis:   1. 4-5 mm SDH  2. b/l pleural effusions  3. Right leg cellulitis    Active Problems/injuries:   1. 4-5 mm SDH  2. b/l pleural effusions  3. Right leg cellulitis    Consultants:  [X] Cardiology  [ ] Endocrine  [ ] Infectious Disease  [ ] Medicine  [X]Neurosurgery  [ ] Ortho        [ ] Weight Bearing Status:  [ ] Palliative       [ ] Advanced Directives:    [ ] Physical Medicine and Rehab       [ ] Disposition :   [ ] Plastics  [ ] Pulmonary    Medications  allopurinol 100 milliGRAM(s) Oral daily  atenolol  Tablet 25 milliGRAM(s) Oral daily  atorvastatin 40 milliGRAM(s) Oral at bedtime  bisacodyl Suppository 10 milliGRAM(s) Rectal daily PRN  ceFAZolin  Injectable. 2000 milliGRAM(s) IV Push every 8 hours  diphtheria/tetanus Vaccine - Adult 0.5 milliLiter(s) IntraMuscular once  donepezil 5 milliGRAM(s) Oral at bedtime  furosemide    Tablet 20 milliGRAM(s) Oral daily  heparin   Injectable 5000 Unit(s) SubCutaneous every 8 hours  levETIRAcetam 250 milliGRAM(s) Oral two times a day  melatonin 3 milliGRAM(s) Oral at bedtime  memantine 5 milliGRAM(s) Oral daily  metoprolol tartrate 25 milliGRAM(s) Oral two times a day  polyethylene glycol 3350 17 Gram(s) Oral daily  senna 2 Tablet(s) Oral at bedtime  sertraline 50 milliGRAM(s) Oral daily      [X] I attest I have reviewed and reconciled all medications prior to transfer    Antibiotics:  ceFAZolin  Injectable. 2000 milliGRAM(s) IV Push every 8 hours    Indication: Right leg cellulitis End Date: 02/03/2024    I have discussed this case with Trauma upon transfer and all questions regarding ICU course were answered.  The following items are to be followed up:  1. Monitor hemodynamic monitoring.   2. Maintain  SP02> 92%  3. Continue with DVT ppx- SCD and SQH  4. continue to monitor bowel movement.  5. Monitor urine output and kidney function- follow up ESAU kidney function looks worse.   6. incentive spirometry  7. Continue Furosemide 20mg oral daily.  8. Continue metoprolol

## 2024-02-02 NOTE — PROGRESS NOTE ADULT - PROBLEM SELECTOR PLAN 2
-Has some tachy episodes up to 120s  -C/w BB  -AC / ASA on hold until cleared from neuro Sx
- hx of AFib on Eliquis.   - currently AFib rate controlled on telemetry, occasionally HR goes up to 130-140s overnight when agitated.   - c/w metoprolol 25 mg BID PO for rate control.   - [id number] 3    - Appreciate neurosurgery recs in regards to when it is appropriate to restart AC, repeat CTH stable from previous CTH.   - Will start anticoagulation and ASA once cleared by neurosurgery  - Plan discussed with daughter yesterday and is agreeable.
No prior history, presumed new onset  + 2 pitting edema, LE - extra lasix given yesterday  Unable to obtain symptomatolgy 2/2 mental status  BNP 3.1K  Net negative - patient with poor PO intake yesterday and not accurate I and O's - discussed with nursing   CT chest from 1/29 with BL pleural effusions - satting well on room air  GDMT:    Lasix 20mg po daily  Given xtras dose of IV 40 again today  - 170ml/24 hours  Ct with BB 25mg po bid   BP have been soft - increase GDMT as tolerated

## 2024-02-02 NOTE — PROGRESS NOTE ADULT - PROBLEM SELECTOR PROBLEM 2
Atrial fibrillation
Atrial fibrillation
Acute heart failure with preserved ejection fraction (HFpEF)

## 2024-02-02 NOTE — CHART NOTE - NSCHARTNOTEFT_GEN_A_CORE
SICU TRANSFER NOTE  -----------------------------  ICU Admission Date: 1/29  Transfer Date: 02-02-24 @ 15:18    Admission Diagnosis: Traumatic SDH, Cellulitis    Active Problems/injuries: ESAU    Procedures: XXXXX INCLUDE DATES    Consultants:  [x ] Cardiology  [ ] Endocrine  [ ] Infectious Disease  [ ] Medicine  [ ]Neurosurgery  [ ] Ortho       [ ] Weight Bearing Status:  [ ] Palliative       [ ] Advanced Directives:    [ ] Physical Medicine and Rehab       [ ] Disposition :   [ ] Plastics  [ ] Pulmonary    Medications  allopurinol 100 milliGRAM(s) Oral daily  atenolol  Tablet 25 milliGRAM(s) Oral daily  atorvastatin 40 milliGRAM(s) Oral at bedtime  bisacodyl Suppository 10 milliGRAM(s) Rectal daily PRN  ceFAZolin  Injectable. 2000 milliGRAM(s) IV Push every 8 hours  diphtheria/tetanus Vaccine - Adult 0.5 milliLiter(s) IntraMuscular once  donepezil 5 milliGRAM(s) Oral at bedtime  furosemide    Tablet 20 milliGRAM(s) Oral daily  heparin   Injectable 5000 Unit(s) SubCutaneous every 8 hours  levETIRAcetam 250 milliGRAM(s) Oral two times a day  melatonin 3 milliGRAM(s) Oral at bedtime  memantine 5 milliGRAM(s) Oral daily  metoprolol tartrate 25 milliGRAM(s) Oral two times a day  polyethylene glycol 3350 17 Gram(s) Oral daily  senna 2 Tablet(s) Oral at bedtime  sertraline 50 milliGRAM(s) Oral daily      [x ] I attest I have reviewed and reconciled all medications prior to transfer    IV Fluids  sodium chloride 0.9%.: Solution, 1000 milliLiter(s) infuse at 50 mL/Hr    Indication: XXXXXX    Antibiotics:  ceFAZolin  Injectable. 2000 milliGRAM(s) IV Push every 8 hours    Indication: XXXXXXX End Date:XXXXXXX      I have discussed this case with CONNIE Whelan upon transfer and all questions regarding ICU course were answered.  The following items are to be followed up:      Pt admitted s/p fall on Eliquis for Afib  -SDH initially grew on 6 hr Head CT, CT #3 with stability  -Acute toxic encephalopathy while in SICU, however improved now to baseline Dementia, A&O x2ish  -Keppra 250mg BID x 7 days total    Cards consulted for HF and Afib with RVR  -Pt on Lopressor with improved rate control (On Atenolol at home )     -Last dose Lopressor ordered for today 2/2, Atenolol home dose ordered to restart 2/3)  -Lasix restarted, Cards was giving intermittent IV doses as well  -CHADs2 VASC 3-ok to hold Eliquis and ASA until cleared by Neurosx at follow up    OOBTC  -CXR ordered for am 2/3    Tolerating diet  -Aspiration precautions  -No BM yet per pt, however "think I have to have one now"    Voids  -Monitor Cr, has slightly increased from baseline over the last few days  -Most likely from additional doses of Lasix and decreased PO intake when acutely delirious    SCDs/SQH    PT recs for [facility], however pt from living facility, plan to return there    Pt is DNR with trial NIV SICU TRANSFER NOTE  -----------------------------  ICU Admission Date: 1/29  Transfer Date: 02-02-24 @ 15:18    Admission Diagnosis: Traumatic SDH, RLE Cellulitis    Active Problems/injuries: ESAU    Procedures: XXXXX INCLUDE DATES    Consultants:  [x ] Cardiology  [ ] Endocrine  [ ] Infectious Disease  [ ] Medicine  [ ]Neurosurgery  [ ] Ortho       [ ] Weight Bearing Status:  [ ] Palliative       [ ] Advanced Directives:    [ ] Physical Medicine and Rehab       [ ] Disposition :   [ ] Plastics  [ ] Pulmonary    Medications  allopurinol 100 milliGRAM(s) Oral daily  atenolol  Tablet 25 milliGRAM(s) Oral daily  atorvastatin 40 milliGRAM(s) Oral at bedtime  bisacodyl Suppository 10 milliGRAM(s) Rectal daily PRN  ceFAZolin  Injectable. 2000 milliGRAM(s) IV Push every 8 hours  diphtheria/tetanus Vaccine - Adult 0.5 milliLiter(s) IntraMuscular once  donepezil 5 milliGRAM(s) Oral at bedtime  furosemide    Tablet 20 milliGRAM(s) Oral daily  heparin   Injectable 5000 Unit(s) SubCutaneous every 8 hours  levETIRAcetam 250 milliGRAM(s) Oral two times a day  melatonin 3 milliGRAM(s) Oral at bedtime  memantine 5 milliGRAM(s) Oral daily  metoprolol tartrate 25 milliGRAM(s) Oral two times a day  polyethylene glycol 3350 17 Gram(s) Oral daily  senna 2 Tablet(s) Oral at bedtime  sertraline 50 milliGRAM(s) Oral daily      [x ] I attest I have reviewed and reconciled all medications prior to transfer    IV Fluids  sodium chloride 0.9%.: Solution, 1000 milliLiter(s) infuse at 50 mL/Hr    Indication: XXXXXX    Antibiotics:  ceFAZolin  Injectable. 2000 milliGRAM(s) IV Push every 8 hours    Indication: XXXXXXX End Date:XXXXXXX      I have discussed this case with CONNIE Whelan upon transfer and all questions regarding ICU course were answered.  The following items are to be followed up:      Pt admitted s/p fall on Eliquis for Afib  -SDH initially grew on 6 hr Head CT, CT #3 with stability  -Acute toxic encephalopathy while in SICU, however improved now to baseline Dementia, A&O x2ish  -Keppra 250mg BID x 7 days total    Cards consulted for HF and Afib with RVR  -Pt on Lopressor BID with improved rate control      -No plan to switch to home Atenolol as inpatient  -Lasix restarted, Cards was giving intermittent IV doses as well  -CHADs2 VASC 3-ok to hold Eliquis and ASA until cleared by Neurosx at follow up    RLE cellulitis  -Last dose Cefazolin 2/3 (5 day total course)    OOBTC  -CXR ordered for am 2/3    Tolerating diet  -Aspiration precautions  -No BM yet per pt, however "think I have to have one now"    Voids  -Monitor Cr, has slightly increased from baseline over the last few days  -Most likely from additional doses of Lasix and decreased PO intake when acutely delirious    SCDs/SQH    PT recs for [facility], however pt from living facility, plan to return there    Pt is DNR with trial NIV

## 2024-02-02 NOTE — PROGRESS NOTE ADULT - PROBLEM SELECTOR PLAN 3
neuro following  Eliquis and asprin as per neurology  patient with less confusion today - as Seroquel is being held  Man have been d.c  Patient A and O x 3 asking for PT - PCT aware

## 2024-02-02 NOTE — PROGRESS NOTE ADULT - PROBLEM SELECTOR PLAN 1
MARCIAL-VASc score:3 points; will hold home Eliquis for now.    Eliquis on hold - ct on hold as per neuro  Occasional rates up to 130's.    Ct bb Toprol 25mg po bid

## 2024-02-02 NOTE — PROGRESS NOTE ADULT - CRITICAL CARE ATTENDING COMMENT
Poor PO intake, moving bowels  Voiding, Given 40mg lasix IV x 1 this am.    GEN: Sitting in chair, wide awake, very conversant, asking appropriate questions  HEAD:  Posterior occiput w/vertical stapled laceration, right posterior/inferior chin w/resolving ecchymosis, left cheek w/dried scabbing of wound - appears superficial -unchanged  CVS:  Irregularly irregular  PUL:  Decreased at bases b/l but scattered wheezing noted  ABD:  Soft, non tender, non distended  EXT:  Warm, trace edema at ankles (improved), R leg w/ovoid area (anterior) of waning erythema    Fall  Traumatic SDH  R leg cellulitis    -Pain controlled, multimodal pain meds  -Appreciate Cardiology input, patient w/occasional HR to 120s briefly  -H/H stable, scds, sqh  -Tolerating diet, moving bowels  -Voiding, Cr mildly increased today, but responded to lasix, bedside POCUS difficult to obtain images of IVC - scattered occasional B lines noted (<3 per field)  -Afebrile, WBC normal, cefazolin for 5 days total -cellulitis improved  No acute critical care issues.  Will transfer from critical care setting.

## 2024-02-02 NOTE — PROGRESS NOTE ADULT - SUBJECTIVE AND OBJECTIVE BOX
HPI:  Patient is an 87 y/o male with a PMH of HTN, HLD, prostate cancer, dementia and afib on eliquis presenting after a mechanical fall this morning. Per EMS the patient was walking in the hallway when he fell backwards and struck his head against the wall/the floor. He was noted to possibly have some confusion as compared to his baseline per his nursing facility. Upon arrival he has no complaints and denies headache or pain anywhere, He has a small occipital head lac that is not actively bleeding.    (29 Jan 2024 13:01)      INTERVAL EVENTS:   Patient downgraded to stepdown level of care. Tachycardic to 110s, LE duplex ordered and found to be negative for DVT.      Subjective:   Patient denies headache, visual changes, chest pain, SOB, abdominal pain, or any other acute complaints.    PHYSICAL EXAM:    Constitutional: NAD, well-groomed, well-developed  HEENT: PERRL, EOMI, no drainage or redness  Neck: No JVD  Respiratory: Breath Sounds equal & clear to auscultation, no accessory muscle use  Cardiovascular: Irregular rate; normal S1, S2; no murmurs, gallops or rubs   Gastrointestinal: Soft, non-tender, no hepatosplenomegaly, normal bowel sounds  Extremities: 1+ pitting edema to b/l LE; anterior aspect of RLE with poorly demarcated erythema and warmth; No cyanosis, clubbing   Vascular: Equal and normal pulses: 2+ peripheral pulses throughout  Neurological: GCS:15 (E3/V5/M6). A&O x 2; no sensory, motor or coordination deficits, normal reflexes  Psychiatric: Normal mood, normal affect  Musculoskeletal: No joint pain, swelling or deformity; no limitation of movement  Skin: Anterior aspect of RLE with area of poorly demarcated erythema and warmth        MEDICATIONS  (STANDING):  allopurinol 100 milliGRAM(s) Oral daily  atorvastatin 40 milliGRAM(s) Oral at bedtime  ceFAZolin  Injectable. 2000 milliGRAM(s) IV Push every 8 hours  diphtheria/tetanus Vaccine - Adult 0.5 milliLiter(s) IntraMuscular once  donepezil 5 milliGRAM(s) Oral at bedtime  furosemide    Tablet 20 milliGRAM(s) Oral daily  heparin   Injectable 5000 Unit(s) SubCutaneous every 8 hours  levETIRAcetam 250 milliGRAM(s) Oral two times a day  melatonin 3 milliGRAM(s) Oral at bedtime  memantine 5 milliGRAM(s) Oral daily  metoprolol tartrate 25 milliGRAM(s) Oral two times a day  polyethylene glycol 3350 17 Gram(s) Oral daily  senna 2 Tablet(s) Oral at bedtime  sertraline 50 milliGRAM(s) Oral daily    MEDICATIONS  (PRN):  bisacodyl Suppository 10 milliGRAM(s) Rectal daily PRN Constipation            ICU Vital Signs Last 24 Hrs  T(C): 37.6 (02 Feb 2024 04:00), Max: 37.6 (02 Feb 2024 04:00)  T(F): 99.7 (02 Feb 2024 04:00), Max: 99.7 (02 Feb 2024 04:00)  HR: 88 (02 Feb 2024 04:00) (84 - 111)  BP: 126/78 (02 Feb 2024 04:00) (114/67 - 143/88)  BP(mean): 94 (02 Feb 2024 04:00) (82 - 102)  ABP: --  ABP(mean): --  RR: 16 (02 Feb 2024 04:00) (14 - 17)  SpO2: 94% (02 Feb 2024 04:00) (92% - 97%)    O2 Parameters below as of 02 Feb 2024 04:00  Patient On (Oxygen Delivery Method): room air                I&O's Detail    31 Jan 2024 07:01  -  01 Feb 2024 07:00  --------------------------------------------------------  IN:    Oral Fluid: 357 mL  Total IN: 357 mL    OUT:    Voided (mL): 550 mL  Total OUT: 550 mL    Total NET: -193 mL      01 Feb 2024 07:01  -  02 Feb 2024 04:49  --------------------------------------------------------  IN:    IV PiggyBack: 50 mL    Oral Fluid: 280 mL  Total IN: 330 mL    OUT:    Voided (mL): 500 mL  Total OUT: 500 mL    Total NET: -170 mL                    LABS:  CBC Full  -  ( 01 Feb 2024 13:17 )  WBC Count : 6.78 K/uL  RBC Count : 3.60 M/uL  Hemoglobin : 10.5 g/dL  Hematocrit : 32.4 %  Platelet Count - Automated : 195 K/uL  Mean Cell Volume : 90.0 fl  Mean Cell Hemoglobin : 29.2 pg  Mean Cell Hemoglobin Concentration : 32.4 gm/dL  Auto Neutrophil # : x  Auto Lymphocyte # : x  Auto Monocyte # : x  Auto Eosinophil # : x  Auto Basophil # : x  Auto Neutrophil % : x  Auto Lymphocyte % : x  Auto Monocyte % : x  Auto Eosinophil % : x  Auto Basophil % : x    02-01    139  |  99  |  21.0<H>  ----------------------------<  136<H>  3.8   |  27.0  |  1.11    Ca    8.7      01 Feb 2024 13:17  Phos  3.4     02-01  Mg     1.9     02-01        Urinalysis Basic - ( 01 Feb 2024 13:17 )    Color: x / Appearance: x / SG: x / pH: x  Gluc: 136 mg/dL / Ketone: x  / Bili: x / Urobili: x   Blood: x / Protein: x / Nitrite: x   Leuk Esterase: x / RBC: x / WBC x   Sq Epi: x / Non Sq Epi: x / Bacteria: x

## 2024-02-02 NOTE — PROGRESS NOTE ADULT - ASSESSMENT
ASSESSMENT:  Patient is an 89 y/o male with a PMH of HTN, HLD, prostate cancer, dementia, and afib (on eliquis), presented after fall on 1/29, found to have 4-5mm SDH.     PLAN:    NEURO:   #Right vertex SDH, stable   #H/o dementia  -Currently, GCS:15 (E3/V5/M6)   - Continue Keppra for seizure prophylaxis x 7 days   - Continue home Donepezil, Namenda, and Sertraline; holding Seroquel at bedtime d/t sedation during the daytime  - Tertiary exam performed 1/30  - [facility] trauma consulted 1/29    CV:   #H/o Afib   #HFpEF   -TTE 1/30: EF 70-75%, RA severely dilated, elevated pulmonary artery pressure, mild TR   -Cardiology consulted, appreciate recommendations; pro-BNP 3.1K  -Continue Metoprolol, increased to 25 BID for tachycardia   - Continue home lipitor and lasix  -MARCIAL-VASc score:3 points; will hold home Eliquis for now iso SDH     PULM:   #B/l Pleural Effusions  -CT chest with b/l pleural effusions, R>L, currently satting well on RA  - CTM respiratory status, goal SpO2> 92%  - Incentive spirometer for compressive atelectasis      GI:   -Regular diet  -Monitor for bowel movement    RENAL:   - Voiding spontaneously   - Monitor UOP and CMP  - Replete electrolytes prn, goal Mag > 2, Phos > 3, K >4    HEME:  - LE duplex 2/1 negative for DVT  - VTE prophylaxis: SCDs & SQH    ID:   #RLE cellulitis  -Continue Cefazolin x 5 days for cellulitis  -Monitor WBC and fever curve     ENDO:   -No active issues, goal glucose <180    SKIN:   -Lines: PIVs  -2cm scalp laceration, stapled on 1/29  -Antibiotics for RLE cellulitis as above    DISPO: Step Down level of care, consider downgrade to the floor in AM.

## 2024-02-03 LAB
ANION GAP SERPL CALC-SCNC: 13 MMOL/L — SIGNIFICANT CHANGE UP (ref 5–17)
BUN SERPL-MCNC: 25.1 MG/DL — HIGH (ref 8–20)
CALCIUM SERPL-MCNC: 8.6 MG/DL — SIGNIFICANT CHANGE UP (ref 8.4–10.5)
CHLORIDE SERPL-SCNC: 98 MMOL/L — SIGNIFICANT CHANGE UP (ref 96–108)
CO2 SERPL-SCNC: 26 MMOL/L — SIGNIFICANT CHANGE UP (ref 22–29)
CREAT SERPL-MCNC: 0.97 MG/DL — SIGNIFICANT CHANGE UP (ref 0.5–1.3)
EGFR: 76 ML/MIN/1.73M2 — SIGNIFICANT CHANGE UP
GLUCOSE SERPL-MCNC: 144 MG/DL — HIGH (ref 70–99)
HCT VFR BLD CALC: 33.3 % — LOW (ref 39–50)
HGB BLD-MCNC: 10.8 G/DL — LOW (ref 13–17)
MAGNESIUM SERPL-MCNC: 1.9 MG/DL — SIGNIFICANT CHANGE UP (ref 1.6–2.6)
MCHC RBC-ENTMCNC: 29.4 PG — SIGNIFICANT CHANGE UP (ref 27–34)
MCHC RBC-ENTMCNC: 32.4 GM/DL — SIGNIFICANT CHANGE UP (ref 32–36)
MCV RBC AUTO: 90.7 FL — SIGNIFICANT CHANGE UP (ref 80–100)
PHOSPHATE SERPL-MCNC: 2.5 MG/DL — SIGNIFICANT CHANGE UP (ref 2.4–4.7)
PLATELET # BLD AUTO: 203 K/UL — SIGNIFICANT CHANGE UP (ref 150–400)
POTASSIUM SERPL-MCNC: 3.9 MMOL/L — SIGNIFICANT CHANGE UP (ref 3.5–5.3)
POTASSIUM SERPL-SCNC: 3.9 MMOL/L — SIGNIFICANT CHANGE UP (ref 3.5–5.3)
RBC # BLD: 3.67 M/UL — LOW (ref 4.2–5.8)
RBC # FLD: 16.4 % — HIGH (ref 10.3–14.5)
SODIUM SERPL-SCNC: 137 MMOL/L — SIGNIFICANT CHANGE UP (ref 135–145)
WBC # BLD: 6.42 K/UL — SIGNIFICANT CHANGE UP (ref 3.8–10.5)
WBC # FLD AUTO: 6.42 K/UL — SIGNIFICANT CHANGE UP (ref 3.8–10.5)

## 2024-02-03 PROCEDURE — 71045 X-RAY EXAM CHEST 1 VIEW: CPT | Mod: 26

## 2024-02-03 PROCEDURE — 99231 SBSQ HOSP IP/OBS SF/LOW 25: CPT | Mod: GC

## 2024-02-03 RX ADMIN — LEVETIRACETAM 250 MILLIGRAM(S): 250 TABLET, FILM COATED ORAL at 17:15

## 2024-02-03 RX ADMIN — HEPARIN SODIUM 5000 UNIT(S): 5000 INJECTION INTRAVENOUS; SUBCUTANEOUS at 03:11

## 2024-02-03 RX ADMIN — Medication 2000 MILLIGRAM(S): at 14:15

## 2024-02-03 RX ADMIN — POLYETHYLENE GLYCOL 3350 17 GRAM(S): 17 POWDER, FOR SOLUTION ORAL at 11:58

## 2024-02-03 RX ADMIN — Medication 25 MILLIGRAM(S): at 17:25

## 2024-02-03 RX ADMIN — Medication 100 MILLIGRAM(S): at 11:58

## 2024-02-03 RX ADMIN — HEPARIN SODIUM 5000 UNIT(S): 5000 INJECTION INTRAVENOUS; SUBCUTANEOUS at 17:15

## 2024-02-03 RX ADMIN — SERTRALINE 50 MILLIGRAM(S): 25 TABLET, FILM COATED ORAL at 11:57

## 2024-02-03 RX ADMIN — HEPARIN SODIUM 5000 UNIT(S): 5000 INJECTION INTRAVENOUS; SUBCUTANEOUS at 10:57

## 2024-02-03 RX ADMIN — Medication 2000 MILLIGRAM(S): at 05:59

## 2024-02-03 RX ADMIN — MEMANTINE HYDROCHLORIDE 5 MILLIGRAM(S): 10 TABLET ORAL at 11:58

## 2024-02-03 RX ADMIN — LEVETIRACETAM 250 MILLIGRAM(S): 250 TABLET, FILM COATED ORAL at 05:59

## 2024-02-03 NOTE — PROGRESS NOTE ADULT - SUBJECTIVE AND OBJECTIVE BOX
Subjective: Patient seen at bedside resting comfortably, per RN no current complaints, no overnight events, denies n/v/cp/sob.       MEDICATIONS  (STANDING):  allopurinol 100 milliGRAM(s) Oral daily  atorvastatin 40 milliGRAM(s) Oral at bedtime  ceFAZolin  Injectable. 2000 milliGRAM(s) IV Push every 8 hours  diphtheria/tetanus Vaccine - Adult 0.5 milliLiter(s) IntraMuscular once  donepezil 5 milliGRAM(s) Oral at bedtime  furosemide    Tablet 20 milliGRAM(s) Oral daily  heparin   Injectable 5000 Unit(s) SubCutaneous every 8 hours  levETIRAcetam 250 milliGRAM(s) Oral two times a day  melatonin 3 milliGRAM(s) Oral at bedtime  memantine 5 milliGRAM(s) Oral daily  metoprolol tartrate 25 milliGRAM(s) Oral two times a day  polyethylene glycol 3350 17 Gram(s) Oral daily  senna 2 Tablet(s) Oral at bedtime  sertraline 50 milliGRAM(s) Oral daily    MEDICATIONS  (PRN):  bisacodyl Suppository 10 milliGRAM(s) Rectal daily PRN Constipation      Vital Signs Last 24 Hrs  T(C): 36.9 (02 Feb 2024 22:12), Max: 37.6 (02 Feb 2024 04:00)  T(F): 98.4 (02 Feb 2024 22:12), Max: 99.7 (02 Feb 2024 04:00)  HR: 90 (02 Feb 2024 22:12) (88 - 110)  BP: 135/72 (02 Feb 2024 22:12) (126/78 - 142/78)  BP(mean): 90 (02 Feb 2024 12:00) (90 - 94)  RR: 18 (02 Feb 2024 22:12) (15 - 18)  SpO2: 97% (02 Feb 2024 22:12) (94% - 97%)    Parameters below as of 02 Feb 2024 22:12  Patient On (Oxygen Delivery Method): room air        Physical Exam:    Constitutional: NAD, resting comfortably  Respiratory: Respirations non-labored, no accessory muscle use  Gastrointestinal: Soft, non-distended  Head: scalp lac c/d/i    LABS:                        10.8   6.13  )-----------( 203      ( 02 Feb 2024 05:45 )             33.3     02-02    140  |  102  |  27.7<H>  ----------------------------<  130<H>  3.9   |  27.0  |  1.17    Ca    8.4      02 Feb 2024 05:45  Phos  2.7     02-02  Mg     2.1     02-02    TPro  6.0<L>  /  Alb  3.2<L>  /  TBili  0.6  /  DBili  0.2  /  AST  11  /  ALT  <5  /  AlkPhos  91  02-02      Urinalysis Basic - ( 02 Feb 2024 05:45 )    Color: x / Appearance: x / SG: x / pH: x  Gluc: 130 mg/dL / Ketone: x  / Bili: x / Urobili: x   Blood: x / Protein: x / Nitrite: x   Leuk Esterase: x / RBC: x / WBC x   Sq Epi: x / Non Sq Epi: x / Bacteria: x        A: 88M with a PMH of HTN, HLD, prostate cancer, dementia and afib on eliquis admitted after fall on 1/29, found to have  SDH which had originally grown in size, but has subsequently been stable. Course prolonged by cardiac workup for heart failure    P:  SDH stable, monitor for acute changes  Keppra x7 days  Cardiology recs appreciated  Continue cardiac meds; metoprolol, lipitor, lasix  Eliquis held  Encourage IS

## 2024-02-04 LAB
CULTURE RESULTS: SIGNIFICANT CHANGE UP
CULTURE RESULTS: SIGNIFICANT CHANGE UP
SPECIMEN SOURCE: SIGNIFICANT CHANGE UP
SPECIMEN SOURCE: SIGNIFICANT CHANGE UP

## 2024-02-04 PROCEDURE — 99231 SBSQ HOSP IP/OBS SF/LOW 25: CPT

## 2024-02-04 RX ADMIN — SERTRALINE 50 MILLIGRAM(S): 25 TABLET, FILM COATED ORAL at 13:17

## 2024-02-04 RX ADMIN — LEVETIRACETAM 250 MILLIGRAM(S): 250 TABLET, FILM COATED ORAL at 07:31

## 2024-02-04 RX ADMIN — LEVETIRACETAM 250 MILLIGRAM(S): 250 TABLET, FILM COATED ORAL at 17:04

## 2024-02-04 RX ADMIN — HEPARIN SODIUM 5000 UNIT(S): 5000 INJECTION INTRAVENOUS; SUBCUTANEOUS at 07:30

## 2024-02-04 RX ADMIN — MEMANTINE HYDROCHLORIDE 5 MILLIGRAM(S): 10 TABLET ORAL at 13:17

## 2024-02-04 RX ADMIN — Medication 25 MILLIGRAM(S): at 17:04

## 2024-02-04 RX ADMIN — DONEPEZIL HYDROCHLORIDE 5 MILLIGRAM(S): 10 TABLET, FILM COATED ORAL at 00:28

## 2024-02-04 RX ADMIN — Medication 20 MILLIGRAM(S): at 07:32

## 2024-02-04 RX ADMIN — Medication 100 MILLIGRAM(S): at 13:17

## 2024-02-04 RX ADMIN — Medication 25 MILLIGRAM(S): at 07:30

## 2024-02-04 RX ADMIN — ATORVASTATIN CALCIUM 40 MILLIGRAM(S): 80 TABLET, FILM COATED ORAL at 00:27

## 2024-02-04 RX ADMIN — POLYETHYLENE GLYCOL 3350 17 GRAM(S): 17 POWDER, FOR SOLUTION ORAL at 13:16

## 2024-02-04 RX ADMIN — HEPARIN SODIUM 5000 UNIT(S): 5000 INJECTION INTRAVENOUS; SUBCUTANEOUS at 13:17

## 2024-02-04 RX ADMIN — Medication 3 MILLIGRAM(S): at 00:27

## 2024-02-04 NOTE — PROGRESS NOTE ADULT - SUBJECTIVE AND OBJECTIVE BOX
Subjective: patient evaluated and examined at the bedside. No acute complaints. ROS limited - patient is a poor historian. Otherwise denies any fever, chills, HA, dizziness, abdominal pain, n/v.       MEDICATIONS  (STANDING):  allopurinol 100 milliGRAM(s) Oral daily  atorvastatin 40 milliGRAM(s) Oral at bedtime  diphtheria/tetanus Vaccine - Adult 0.5 milliLiter(s) IntraMuscular once  donepezil 5 milliGRAM(s) Oral at bedtime  furosemide    Tablet 20 milliGRAM(s) Oral daily  heparin   Injectable 5000 Unit(s) SubCutaneous every 8 hours  levETIRAcetam 250 milliGRAM(s) Oral two times a day  melatonin 3 milliGRAM(s) Oral at bedtime  memantine 5 milliGRAM(s) Oral daily  metoprolol tartrate 25 milliGRAM(s) Oral two times a day  polyethylene glycol 3350 17 Gram(s) Oral daily  senna 2 Tablet(s) Oral at bedtime  sertraline 50 milliGRAM(s) Oral daily    MEDICATIONS  (PRN):  bisacodyl Suppository 10 milliGRAM(s) Rectal daily PRN Constipation      Vital Signs Last 24 Hrs  T(C): 36.9 (03 Feb 2024 23:57), Max: 37.1 (03 Feb 2024 17:34)  T(F): 98.5 (03 Feb 2024 23:57), Max: 98.7 (03 Feb 2024 17:34)  HR: 124 (03 Feb 2024 23:57) (90 - 124)  BP: 162/85 (03 Feb 2024 23:57) (101/58 - 162/85)  BP(mean): 72 (03 Feb 2024 05:56) (72 - 72)  RR: 14 (03 Feb 2024 23:57) (14 - 18)  SpO2: 97% (03 Feb 2024 23:57) (92% - 97%)    Parameters below as of 03 Feb 2024 23:57  Patient On (Oxygen Delivery Method): room air        Physical Exam:    Constitutional: NAD  Respiratory: Respirations non-labored, no accessory muscle use  Gastrointestinal: Soft, non-tender, non-distended  Extremities: (+) minimal RLE erythema on shin   Neurological: A&O x 1 (baseline w/ dementia); without gross deficit      LABS:                        10.8   6.42  )-----------( 203      ( 03 Feb 2024 08:25 )             33.3     02-03    137  |  98  |  25.1<H>  ----------------------------<  144<H>  3.9   |  26.0  |  0.97    Ca    8.6      03 Feb 2024 08:25  Phos  2.5     02-03  Mg     1.9     02-03    TPro  6.0<L>  /  Alb  3.2<L>  /  TBili  0.6  /  DBili  0.2  /  AST  11  /  ALT  <5  /  AlkPhos  91  02-02      Urinalysis Basic - ( 03 Feb 2024 08:25 )    Color: x / Appearance: x / SG: x / pH: x  Gluc: 144 mg/dL / Ketone: x  / Bili: x / Urobili: x   Blood: x / Protein: x / Nitrite: x   Leuk Esterase: x / RBC: x / WBC x   Sq Epi: x / Non Sq Epi: x / Bacteria: x      Assessment: A: 88M with a PMH of HTN, HLD, prostate cancer, dementia and afib on eliquis admitted after fall on 1/29, found to have SDH which had originally grown in size, but has subsequently been stable. Course prolonged by cardiac workup for heart failure. Cefazolin course completed 2/3.     Plan:  - SDH stable, monitor for acute changes  - appreciate neurosx recs; Keppra x7 days  - Cardiology recs appreciated  - Continue cardiac meds; metoprolol, lipitor, lasix  - Eliquis held  - Encourage IS  - dvt ppx: HSQ and SCDs  - diet: DASH/TLC   - dispo planning: d/c to [facility]

## 2024-02-04 NOTE — PROGRESS NOTE ADULT - NS ATTEND BILL GEN_ALL_CORE
Attending to bill
PA/NP to bill
Attending to bill
Stelara Counseling:  I discussed with the patient the risks of ustekinumab including but not limited to immunosuppression, malignancy, posterior leukoencephalopathy syndrome, and serious infections.  The patient understands that monitoring is required including a PPD at baseline and must alert us or the primary physician if symptoms of infection or other concerning signs are noted.

## 2024-02-04 NOTE — PROGRESS NOTE ADULT - NS ATTEND OPT1 GEN_ALL_CORE

## 2024-02-04 NOTE — PROGRESS NOTE ADULT - NS ATTEND AMEND GEN_ALL_CORE FT
Above assessment noted.  The patient was seen and examined by myself with the surgical PA.  The patient is without new events or complaints overnight. Abdomen is soft and non tender, non distended.  The patient remains trauma stable for discharge planning.
89 y/o M with hx of HTN, HLD, prostate cancer, dementia, and AFib on Eliquis presenting after a mechanical fall in the nursing facility. Per EMS, pt was walking in the hallway where he fell backwards and struck his head against the wall/floor. He was noted to have some confusion in comparison to his baseline per nursing facility. Patient is found to have a small R frontal SDH.      Acute on chronic HFpEF? NYHA unknown, ACC C  persistent atrial fibrillation   chronic anticoagulation use  SDH  hypertension  hyperlipidemia  dementia  hx of prostate cancer      Recommend trial dose of IV lasix 40mg once   c/w metoprolol 25 mg BID PO for rate control. [id number] 3    Appreciate neurosurgery recs in regards to when it is appropriate to restart AC, repeat CTH stable from previous CTH.   start anticoagulation and ASA once cleared by neurosurgery, please see daughter discussion 1/30/24 documented.      Gentle diuresis.  Improve nutritional input    Telemetry appropriate
seen with above,    88M h/o HTN, HLD, prostate cancer, dementia, and chronic AFib on Eliquis with mechanical fall at nursing facility found with R frontal SDH and confusion     -Afib rate control on metoprolol tartrate 25mg BID, can further increase to 50mg BID to keep resting HR <110s, would not restart home med atenolol  -CHADSVasc 3- off Eliquis for now with SDH, outpatient evaluation if candidate for LAAO if within goals of care from her HCP  -pBNP 3K, received IV Lasix 40mg x1 for HFpEF, continue PO 20mg once daily   -PT eval. and check orthostatic   -reconsult if new cardiac issue arise        Shad Bower DO, [facility]  Faculty Non-Invasive Cardiologist  [phone]
89 y/o M with hx of HTN, HLD, prostate cancer, dementia, and AFib on Eliquis presenting after a mechanical fall in the nursing facility. Per EMS, pt was walking in the hallway where he fell backwards and struck his head against the wall/floor. He was noted to have some confusion in comparison to his baseline per nursing facility. Patient is found to have a small R frontal SDH.     Acute on chronic HFpEF? NYHA unknown, ACC C  persistent atrial fibrillation   chronic anticoagulation use  SDH  hypertension  hyperlipidemia  dementia  hx of prostate cancer  pleural effusions ct 1/29/24    IV Lasix 40mg once today.    c/w metoprolol 25 mg BID PO for rate control. Titrate as BP allows to keep average rate <110. [id number]    obtain a albumin level, prealbumin level  please see daughter discussion 1/30/24 documented.      Gentle diuresis.  Improve nutritional input    telemetry reviewed

## 2024-02-05 ENCOUNTER — TRANSCRIPTION ENCOUNTER (OUTPATIENT)
Age: 89
End: 2024-02-05

## 2024-02-05 VITALS
TEMPERATURE: 98 F | DIASTOLIC BLOOD PRESSURE: 83 MMHG | HEART RATE: 82 BPM | SYSTOLIC BLOOD PRESSURE: 132 MMHG | OXYGEN SATURATION: 96 % | RESPIRATION RATE: 18 BRPM

## 2024-02-05 PROCEDURE — 80307 DRUG TEST PRSMV CHEM ANLYZR: CPT

## 2024-02-05 PROCEDURE — 85730 THROMBOPLASTIN TIME PARTIAL: CPT

## 2024-02-05 PROCEDURE — 74177 CT ABD & PELVIS W/CONTRAST: CPT | Mod: MA

## 2024-02-05 PROCEDURE — 83735 ASSAY OF MAGNESIUM: CPT

## 2024-02-05 PROCEDURE — 93005 ELECTROCARDIOGRAM TRACING: CPT

## 2024-02-05 PROCEDURE — 99232 SBSQ HOSP IP/OBS MODERATE 35: CPT | Mod: FS

## 2024-02-05 PROCEDURE — 83880 ASSAY OF NATRIURETIC PEPTIDE: CPT

## 2024-02-05 PROCEDURE — 82803 BLOOD GASES ANY COMBINATION: CPT

## 2024-02-05 PROCEDURE — 71260 CT THORAX DX C+: CPT | Mod: MA

## 2024-02-05 PROCEDURE — 83930 ASSAY OF BLOOD OSMOLALITY: CPT

## 2024-02-05 PROCEDURE — 82330 ASSAY OF CALCIUM: CPT

## 2024-02-05 PROCEDURE — 82962 GLUCOSE BLOOD TEST: CPT

## 2024-02-05 PROCEDURE — 82435 ASSAY OF BLOOD CHLORIDE: CPT

## 2024-02-05 PROCEDURE — 87040 BLOOD CULTURE FOR BACTERIA: CPT

## 2024-02-05 PROCEDURE — 87641 MR-STAPH DNA AMP PROBE: CPT

## 2024-02-05 PROCEDURE — 72170 X-RAY EXAM OF PELVIS: CPT

## 2024-02-05 PROCEDURE — 84436 ASSAY OF TOTAL THYROXINE: CPT

## 2024-02-05 PROCEDURE — G1004: CPT

## 2024-02-05 PROCEDURE — 81003 URINALYSIS AUTO W/O SCOPE: CPT

## 2024-02-05 PROCEDURE — 99284 EMERGENCY DEPT VISIT MOD MDM: CPT | Mod: 25

## 2024-02-05 PROCEDURE — 97163 PT EVAL HIGH COMPLEX 45 MIN: CPT

## 2024-02-05 PROCEDURE — 71045 X-RAY EXAM CHEST 1 VIEW: CPT

## 2024-02-05 PROCEDURE — 84480 ASSAY TRIIODOTHYRONINE (T3): CPT

## 2024-02-05 PROCEDURE — 82550 ASSAY OF CK (CPK): CPT

## 2024-02-05 PROCEDURE — 84443 ASSAY THYROID STIM HORMONE: CPT

## 2024-02-05 PROCEDURE — 72125 CT NECK SPINE W/O DYE: CPT | Mod: MA

## 2024-02-05 PROCEDURE — 36415 COLL VENOUS BLD VENIPUNCTURE: CPT

## 2024-02-05 PROCEDURE — 84134 ASSAY OF PREALBUMIN: CPT

## 2024-02-05 PROCEDURE — 87640 STAPH A DNA AMP PROBE: CPT

## 2024-02-05 PROCEDURE — 80053 COMPREHEN METABOLIC PANEL: CPT

## 2024-02-05 PROCEDURE — 84100 ASSAY OF PHOSPHORUS: CPT

## 2024-02-05 PROCEDURE — 93970 EXTREMITY STUDY: CPT

## 2024-02-05 PROCEDURE — 84439 ASSAY OF FREE THYROXINE: CPT

## 2024-02-05 PROCEDURE — 85027 COMPLETE CBC AUTOMATED: CPT

## 2024-02-05 PROCEDURE — 93306 TTE W/DOPPLER COMPLETE: CPT

## 2024-02-05 PROCEDURE — 84132 ASSAY OF SERUM POTASSIUM: CPT

## 2024-02-05 PROCEDURE — 80048 BASIC METABOLIC PNL TOTAL CA: CPT

## 2024-02-05 PROCEDURE — 99291 CRITICAL CARE FIRST HOUR: CPT | Mod: 25

## 2024-02-05 PROCEDURE — 80076 HEPATIC FUNCTION PANEL: CPT

## 2024-02-05 PROCEDURE — 82607 VITAMIN B-12: CPT

## 2024-02-05 PROCEDURE — 85610 PROTHROMBIN TIME: CPT

## 2024-02-05 PROCEDURE — 84484 ASSAY OF TROPONIN QUANT: CPT

## 2024-02-05 PROCEDURE — 82947 ASSAY GLUCOSE BLOOD QUANT: CPT

## 2024-02-05 PROCEDURE — 85018 HEMOGLOBIN: CPT

## 2024-02-05 PROCEDURE — 83690 ASSAY OF LIPASE: CPT

## 2024-02-05 PROCEDURE — 83605 ASSAY OF LACTIC ACID: CPT

## 2024-02-05 PROCEDURE — 85014 HEMATOCRIT: CPT

## 2024-02-05 PROCEDURE — 70450 CT HEAD/BRAIN W/O DYE: CPT | Mod: MA

## 2024-02-05 PROCEDURE — 84295 ASSAY OF SERUM SODIUM: CPT

## 2024-02-05 PROCEDURE — 85025 COMPLETE CBC W/AUTO DIFF WBC: CPT

## 2024-02-05 RX ORDER — LEVETIRACETAM 250 MG/1
1 TABLET, FILM COATED ORAL
Qty: 0 | Refills: 0 | DISCHARGE
Start: 2024-02-05

## 2024-02-05 RX ORDER — ATENOLOL 25 MG/1
1 TABLET ORAL
Refills: 0 | DISCHARGE

## 2024-02-05 RX ORDER — ASPIRIN/CALCIUM CARB/MAGNESIUM 324 MG
1 TABLET ORAL
Refills: 0 | DISCHARGE

## 2024-02-05 RX ORDER — METOPROLOL TARTRATE 50 MG
1 TABLET ORAL
Qty: 0 | Refills: 0 | DISCHARGE
Start: 2024-02-05

## 2024-02-05 RX ORDER — ALLOPURINOL 300 MG
1 TABLET ORAL
Qty: 0 | Refills: 0 | DISCHARGE
Start: 2024-02-05

## 2024-02-05 RX ORDER — POLYETHYLENE GLYCOL 3350 17 G/17G
17 POWDER, FOR SOLUTION ORAL
Qty: 0 | Refills: 0 | DISCHARGE
Start: 2024-02-05

## 2024-02-05 RX ORDER — APIXABAN 2.5 MG/1
1 TABLET, FILM COATED ORAL
Refills: 0 | DISCHARGE

## 2024-02-05 RX ORDER — SENNA PLUS 8.6 MG/1
2 TABLET ORAL
Qty: 0 | Refills: 0 | DISCHARGE
Start: 2024-02-05

## 2024-02-05 RX ADMIN — Medication 25 MILLIGRAM(S): at 06:45

## 2024-02-05 RX ADMIN — ATORVASTATIN CALCIUM 40 MILLIGRAM(S): 80 TABLET, FILM COATED ORAL at 00:52

## 2024-02-05 RX ADMIN — DONEPEZIL HYDROCHLORIDE 5 MILLIGRAM(S): 10 TABLET, FILM COATED ORAL at 00:45

## 2024-02-05 RX ADMIN — HEPARIN SODIUM 5000 UNIT(S): 5000 INJECTION INTRAVENOUS; SUBCUTANEOUS at 00:45

## 2024-02-05 RX ADMIN — Medication 3 MILLIGRAM(S): at 00:44

## 2024-02-05 RX ADMIN — LEVETIRACETAM 250 MILLIGRAM(S): 250 TABLET, FILM COATED ORAL at 06:45

## 2024-02-05 RX ADMIN — HEPARIN SODIUM 5000 UNIT(S): 5000 INJECTION INTRAVENOUS; SUBCUTANEOUS at 06:45

## 2024-02-05 RX ADMIN — SENNA PLUS 2 TABLET(S): 8.6 TABLET ORAL at 00:44

## 2024-02-05 RX ADMIN — Medication 20 MILLIGRAM(S): at 06:45

## 2024-02-05 NOTE — DISCHARGE NOTE PROVIDER - CARE PROVIDERS DIRECT ADDRESSES
[email or web address]

## 2024-02-05 NOTE — PROGRESS NOTE ADULT - SUBJECTIVE AND OBJECTIVE BOX
INTERVAL HPI/OVERNIGHT EVENTS:    Patient evaluated at bedside. NAOE. Patient denies N/V/CP/SOB, no subjective fevers or chills. Pain well controlled. Pending [facility].    MEDICATIONS  (STANDING):  allopurinol 100 milliGRAM(s) Oral daily  atorvastatin 40 milliGRAM(s) Oral at bedtime  diphtheria/tetanus Vaccine - Adult 0.5 milliLiter(s) IntraMuscular once  donepezil 5 milliGRAM(s) Oral at bedtime  furosemide    Tablet 20 milliGRAM(s) Oral daily  heparin   Injectable 5000 Unit(s) SubCutaneous every 8 hours  levETIRAcetam 250 milliGRAM(s) Oral two times a day  melatonin 3 milliGRAM(s) Oral at bedtime  memantine 5 milliGRAM(s) Oral daily  metoprolol tartrate 25 milliGRAM(s) Oral two times a day  polyethylene glycol 3350 17 Gram(s) Oral daily  senna 2 Tablet(s) Oral at bedtime  sertraline 50 milliGRAM(s) Oral daily    MEDICATIONS  (PRN):  bisacodyl Suppository 10 milliGRAM(s) Rectal daily PRN Constipation      Vital Signs Last 24 Hrs  T(C): 36.4 (05 Feb 2024 08:28), Max: 37.1 (04 Feb 2024 12:45)  T(F): 97.6 (05 Feb 2024 08:28), Max: 98.7 (04 Feb 2024 12:45)  HR: 82 (05 Feb 2024 08:28) (82 - 110)  BP: 132/83 (05 Feb 2024 08:28) (118/84 - 175/81)  BP(mean): --  RR: 18 (05 Feb 2024 08:28) (18 - 19)  SpO2: 96% (05 Feb 2024 08:28) (94% - 99%)    Parameters below as of 05 Feb 2024 08:28  Patient On (Oxygen Delivery Method): room air      Physical Exam  Constitutional: NAD  Respiratory: respirations even, unlabored on room air  Gastrointestinal: Soft, non-tender, non-distended  Extremities: (+) minimal RLE erythema on shin   Neurological: A&O x 1 (baseline w/ dementia); without gross deficit  Skin: scalp staples      I&O's Detail    04 Feb 2024 07:01 - 05 Feb 2024 07:00  --------------------------------------------------------  IN:    Oral Fluid: 360 mL  Total IN: 360 mL    OUT:    Voided (mL): 975 mL  Total OUT: 975 mL    Total NET: -615 mL

## 2024-02-05 NOTE — PROGRESS NOTE ADULT - ASSESSMENT
88M with a PMH of HTN, HLD, prostate cancer, dementia and afib on eliquis admitted after fall on 1/29, found to have SDH which had originally grown in size, but has subsequently been stable. Course prolonged by cardiac workup for heart failure. Cefazolin course completed 2/3.     Plan:  - SDH stable, monitor for acute changes  - appreciate neurosx recs; Keppra x7 days  - Cardiology recs appreciated  - Continue cardiac meds; metoprolol, lipitor, lasix  - Eliquis held  - Encourage IS  - dvt ppx: HSQ and SCDs  - diet: DASH/TLC   - dispo planning: d/c to [facility]

## 2024-02-05 NOTE — DISCHARGE NOTE PROVIDER - CARE PROVIDER_API CALL
Lilibeth Zaragoza  Neurosurgery  [address] [place] [id number]  Phone: [phone]  Fax: [phone]  Follow Up Time:     Shad Bower  Cardiovascular Disease  [address] [facility], [facility] [address]  [place] [address]  Phone: [phone]  Fax: [phone]  Follow Up Time:

## 2024-02-05 NOTE — PROGRESS NOTE ADULT - REASON FOR ADMISSION
Fall, SDH

## 2024-02-05 NOTE — DISCHARGE NOTE NURSING/CASE MANAGEMENT/SOCIAL WORK - NSDCVIVACCINE_GEN_ALL_CORE_FT
Tdap; 18-Jan-2024 12:04; Traci Savage (RN); [employer]; [id number] (Exp. Date: 20-Jul-2025); IntraMuscular; Deltoid Right.; 0.5 milliLiter(s); VIS (VIS Published: 09-May-2013, VIS Presented: 18-Jan-2024);

## 2024-02-05 NOTE — DISCHARGE NOTE PROVIDER - NSDCMRMEDTOKEN_GEN_ALL_CORE_FT
acetaminophen 325 mg oral tablet: 2 tab(s) orally every 6 hours as needed for  mild pain  allopurinol 100 mg oral tablet: 1 tab(s) orally once a day  atorvastatin 40 mg oral tablet: 1 tab(s) orally once a day (at bedtime)  donepezil 5 mg oral tablet: 1 tab(s) orally once a day At dinner  furosemide 20 mg oral tablet: 1 tab(s) orally once a day  levETIRAcetam 250 mg oral tablet: 1 tab(s) orally 2 times a day  magnesium oxide 400 mg oral tablet: 1 tab(s) orally once a day  melatonin 3 mg oral tablet: 1 tab(s) orally once a day (at bedtime) (in combination with 5 mG tablet for total 7 mG per dose)  memantine 5 mg oral tablet: 1 tab(s) orally 2 times a day  metoprolol tartrate 25 mg oral tablet: 1 tab(s) orally 2 times a day  polyethylene glycol 3350 oral powder for reconstitution: 17 gram(s) orally once a day  Potassium Chloride (Eqv-Klor-Con 10) 10 mEq oral tablet, extended release: 1 tab(s) orally once a day  senna leaf extract oral tablet: 2 tab(s) orally once a day (at bedtime)  sertraline 50 mg oral tablet: 1 tab(s) orally once a day

## 2024-02-05 NOTE — PROGRESS NOTE ADULT - PROVIDER SPECIALTY LIST ADULT
SICU
Trauma Surgery
Hospitalist
Hospitalist
SICU
Trauma Surgery
Neurosurgery
Neurosurgery
SICU
SICU
Trauma Surgery
Cardiology

## 2024-02-05 NOTE — DISCHARGE NOTE NURSING/CASE MANAGEMENT/SOCIAL WORK - PATIENT PORTAL LINK FT
You can access the FollowMyHealth Patient Portal offered by [facility] by registering at the following website: [email or web address]. By joining [facility]’s FollowMyHealth portal, you will also be able to view your health information using other applications (apps) compatible with our system.

## 2024-02-05 NOTE — DISCHARGE NOTE PROVIDER - HOSPITAL COURSE
88M PMHx HTN, HLD, prostate cancer, dementia and afib on Eliquis presenting after a mechanical fall striking his head against the wall/the floor. Imaging done in the ED revealed SDH.  Seen by Neurosurgery.  Repeat CT initially stable, 3rd CT slightly increased in size but then stable afterwards.  Seen by PT and deemed appropriate to return to [facility].  Pt. currently medically stable for return to [facility] at this time.

## 2024-02-05 NOTE — DISCHARGE NOTE PROVIDER - NSDCCPCAREPLAN_GEN_ALL_CORE_FT
PRINCIPAL DISCHARGE DIAGNOSIS  Diagnosis: Traumatic subdural hematoma  Assessment and Plan of Treatment: Follow up: Please call and make an appointment with Dr. Zaragoza, Neurosurgery 10-14 days after discharge. Also, please call and make an appointment with your primary care physician as per your usual schedule.   Activity: May return to normal activities as tolerated.  Diet: May continue regular diet as prior to admission.  Medications: Please take all medications listed on your discharge paperwork as prescribed.  You are encouraged to take over-the-counter tylenol and/or ibuprofen for pain relief as directed.  Wound Care: Please, keep wound site clean and dry. You may shower, but do not bathe.  Patient is advised to RETURN TO THE EMERGENCY DEPARTMENT for any of the following - worsening pain, fever/chills, nausea/vomiting, altered mental status, chest pain, shortness of breath, or any other new / worsening symptom.

## 2024-02-05 NOTE — PROGRESS NOTE ADULT - ATTENDING COMMENTS
Above assessment noted.  The patient was seen and examined by myself with the surgical PA and resident. The patient is without new events or complaints.  Abdomen is softly distended without localizing tenderness, no guarding, no rebound.  The patient is trauma stable.  Discharge planning.
Seen and examined on trauma rounds. Alert, oriented - albeit appears to take a pause to process each question. No focal neuro deficits.   Acceptable vitals, and labs.   --MMPR   --will switch DVT ppx to lovenox   --Diet as tolerated.   --Mobilize   --SW, dispo

## 2024-02-14 ENCOUNTER — APPOINTMENT (OUTPATIENT)
Dept: NEUROSURGERY | Facility: CLINIC | Age: 89
End: 2024-02-14
Payer: MEDICARE

## 2024-02-14 VITALS
WEIGHT: 200 LBS | BODY MASS INDEX: 28.63 KG/M2 | HEIGHT: 70 IN | SYSTOLIC BLOOD PRESSURE: 113 MMHG | HEART RATE: 79 BPM | OXYGEN SATURATION: 97 % | DIASTOLIC BLOOD PRESSURE: 72 MMHG | TEMPERATURE: 97.5 F

## 2024-02-14 PROCEDURE — 99203 OFFICE O/P NEW LOW 30 MIN: CPT

## 2024-02-14 NOTE — ASSESSMENT
[FreeTextEntry1] : Mr. Basil Lainez is a very pleasant 88 year old male with a history of HTN, HLD, prostate CA, dementia, Afib on Eliquis, also on ASA 81 who presents for follow-up after a fall at the end of January with head CT demonstrating a right frontal subdural hematoma.  He is overall doing well and appears to not have any focal neurological deficits.  He should remain on his antiseizure medications and continue to hold all blood thinners.  I would like to see him back in 1 month with a repeat head CT at that time.  I also called his daughter, Lis Rodriguez, to update her.  She reports that on Saturday she was concerned because he was quite sleepy and so was relieved to hear that he was doing well today.  All questions answered.  They know to call my office with any issues or concerns.

## 2024-02-14 NOTE — PHYSICAL EXAM
[FreeTextEntry1] : Awake, oriented to person and year Resolving bruises on his face No pronator drift Moving all extremities well antigravity and symmetrically

## 2024-02-14 NOTE — HISTORY OF PRESENT ILLNESS
[FreeTextEntry1] : Mr. Basil Lainez is a very pleasant 88 year old male with a history of HTN, HLD, prostate CA, dementia, Afib on Eliquis, also on ASA 81 who presents for follow-up after a fall at the end of January with head CT demonstrating a right frontal subdural hematoma.  He is here from rehab and reports that he has had some dizziness and feels fatigued but denies any headaches.  He denies any focal neurological deficits.  He is on antiseizure medication and remains off of his blood thinners.

## 2024-03-12 ENCOUNTER — APPOINTMENT (OUTPATIENT)
Dept: NEUROSURGERY | Facility: CLINIC | Age: 89
End: 2024-03-12
Payer: MEDICARE

## 2024-03-12 VITALS
WEIGHT: 195 LBS | HEART RATE: 84 BPM | DIASTOLIC BLOOD PRESSURE: 76 MMHG | HEIGHT: 70 IN | OXYGEN SATURATION: 96 % | SYSTOLIC BLOOD PRESSURE: 137 MMHG | BODY MASS INDEX: 27.92 KG/M2 | TEMPERATURE: 98 F

## 2024-03-12 DIAGNOSIS — S06.5XAA TRAUMATIC SUBDURAL HEMORRHAGE WITH LOSS OF CONSCIOUSNESS STATUS UNKNOWN, INITIAL ENCOUNTER: ICD-10-CM

## 2024-03-12 PROCEDURE — 99213 OFFICE O/P EST LOW 20 MIN: CPT

## 2024-03-12 NOTE — HISTORY OF PRESENT ILLNESS
[FreeTextEntry1] : Mr. Basil Lainez is a very pleasant 88 year old male with a history of HTN, HLD, prostate CA, dementia, Afib on Eliquis, also on ASA 81 who presents for follow-up after a fall at the end of January with head CT demonstrating a right frontal subdural hematoma. He is here from rehab and denies any new symptoms or neurological deficits. He continues on keppra and is off his blood thinners.  I spoke with his daughter via phone who reports that he continues to have some confusion and frustration of being in rehab.

## 2024-03-12 NOTE — PHYSICAL EXAM
[FreeTextEntry1] : Awake, oriented to person and year Resolving bruises on his face No pronator drift Moving all extremities well antigravity and symmetrically.

## 2024-10-16 NOTE — ASSESSMENT
[FreeTextEntry1] : Mr. Basil Lainez is a very pleasant 88 year old male with a history of HTN, HLD, prostate CA, dementia, Afib on Eliquis, also on ASA 81 who presents for follow-up after a fall at the end of January with head CT demonstrating a right frontal subdural hematoma.  No new neurological issues.  I am not able to view his head CT images directly but the report states that there is no evidence of blood.  He can stop his Keppra.  He is also okay to restart his Eliquis and aspirin 81.  I discussed this with his daughter Lis as well via phone.  He can follow-up with me on an as-needed basis.  All questions answered.  His daughter knows to call my office with any issues or concerns. 11